# Patient Record
Sex: FEMALE | Race: BLACK OR AFRICAN AMERICAN | NOT HISPANIC OR LATINO | URBAN - METROPOLITAN AREA
[De-identification: names, ages, dates, MRNs, and addresses within clinical notes are randomized per-mention and may not be internally consistent; named-entity substitution may affect disease eponyms.]

---

## 2018-03-05 ENCOUNTER — TRANSCRIBE ORDERS (OUTPATIENT)
Dept: CARDIOLOGY | Facility: CLINIC | Age: 78
End: 2018-03-05

## 2018-03-05 DIAGNOSIS — R06.02 SOB (SHORTNESS OF BREATH): Primary | ICD-10-CM

## 2018-05-01 ENCOUNTER — TELEPHONE (OUTPATIENT)
Dept: SCHEDULING | Facility: CLINIC | Age: 78
End: 2018-05-01

## 2018-05-01 NOTE — TELEPHONE ENCOUNTER
Pt calling to state that she will be having a procedure on 5/9/18, they will be lifting the vein in  Her left wrist, she stated she has a fistula there, and the pt needs to be cleared. Dr. Ellis [phone], F# [phone] last seen on 2/28/18 can an addendum be written?

## 2018-05-01 NOTE — TELEPHONE ENCOUNTER
Dr Pittman pt calling re: Clearance Letter for Procedure on her port at [facility] on 5/9/18 w/ Dr Matos cell [phone]  [phone] ... Please call pt to advise

## 2018-05-03 ENCOUNTER — DOCUMENTATION (OUTPATIENT)
Dept: CARDIOLOGY | Facility: CLINIC | Age: 78
End: 2018-05-03

## 2018-05-03 NOTE — TELEPHONE ENCOUNTER
Spoke to Chica, told her you cleared her and to definitely schedule NST and follow up when she feels better after her surgery. I also left a message for Shaq in pre admin that the clearance will be faxed as soon as our phone lines are back up.   thx

## 2018-05-03 NOTE — TELEPHONE ENCOUNTER
Dr Hughes, Chica stopped in the office, She is wanting clearance for her Fistula adjustment scheduled for Wednesday 5-9-2018 at [facility]. I told her you wanted her to have a NST (which I had already tried to schedule several times but Chica did not call me back) When I told her that she said she she has dialysis M-W-F and that nothing is wrong with her heart and she just needs to get this surgery so she can get her life back. Please advise and also Chica has an appt with you on May 8th and she wants to know if she needs to keep that??  Thx

## 2018-05-03 NOTE — PROGRESS NOTES
The patient states that she is scheduled for dialysis access revision approximately 1 week from now.  She was recommended stress testing given her risk factors, but she has not undergone this study and states that she has very difficult IV access.  Her dialysis access is the priority at the present time.  The patient had an echocardiogram with normal left ventricular function in November 2017.  The patient presents acceptable risk to undergo dialysis access/revision.  Please allow her to take her carvedilol the morning of the procedure with a small sip of water.  If you have any further questions, please do not hesitate to contact me.

## 2018-05-03 NOTE — TELEPHONE ENCOUNTER
I put a note in her chart for preoperative clearance.  If she is having the procedure on the ninth, I do not need to see her on the eighth.  She should schedule a follow-up appointment following her dialysis access and try to get her stress test scheduled.  Thank you

## 2018-05-04 PROBLEM — Z82.49 FAMILY HISTORY OF EARLY CAD: Status: ACTIVE | Noted: 2018-05-04

## 2018-05-04 PROBLEM — I10 HYPERTENSION: Status: ACTIVE | Noted: 2018-05-04

## 2018-12-11 ENCOUNTER — TELEPHONE (OUTPATIENT)
Dept: CARDIOLOGY | Facility: CLINIC | Age: 78
End: 2018-12-11

## 2019-01-15 ENCOUNTER — OFFICE VISIT (OUTPATIENT)
Dept: CARDIOLOGY | Facility: CLINIC | Age: 79
End: 2019-01-15
Payer: COMMERCIAL

## 2019-01-15 VITALS
DIASTOLIC BLOOD PRESSURE: 66 MMHG | SYSTOLIC BLOOD PRESSURE: 130 MMHG | WEIGHT: 194.8 LBS | RESPIRATION RATE: 18 BRPM | BODY MASS INDEX: 35.85 KG/M2 | HEART RATE: 60 BPM | HEIGHT: 62 IN

## 2019-01-15 DIAGNOSIS — Z99.2 ESRD ON HEMODIALYSIS (CMS/HCC): ICD-10-CM

## 2019-01-15 DIAGNOSIS — I10 ESSENTIAL HYPERTENSION: Primary | ICD-10-CM

## 2019-01-15 DIAGNOSIS — R94.31 ABNORMAL EKG: ICD-10-CM

## 2019-01-15 DIAGNOSIS — I49.8 BRADYARRHYTHMIA: ICD-10-CM

## 2019-01-15 DIAGNOSIS — N18.6 ESRD ON HEMODIALYSIS (CMS/HCC): ICD-10-CM

## 2019-01-15 DIAGNOSIS — M06.9 RHEUMATOID ARTHRITIS INVOLVING BOTH HANDS, UNSPECIFIED RHEUMATOID FACTOR PRESENCE: ICD-10-CM

## 2019-01-15 DIAGNOSIS — I49.1 ECTOPIC ATRIAL RHYTHM: ICD-10-CM

## 2019-01-15 DIAGNOSIS — I49.1 PAC (PREMATURE ATRIAL CONTRACTION): ICD-10-CM

## 2019-01-15 PROBLEM — F32.9 MDD (MAJOR DEPRESSIVE DISORDER): Status: ACTIVE | Noted: 2018-07-30

## 2019-01-15 PROBLEM — R42 DISEQUILIBRIUM: Status: ACTIVE | Noted: 2018-08-12

## 2019-01-15 PROBLEM — E87.70 VOLUME OVERLOAD: Status: ACTIVE | Noted: 2018-07-29

## 2019-01-15 PROBLEM — R42 VERTIGO: Status: ACTIVE | Noted: 2018-05-31

## 2019-01-15 PROCEDURE — 99214 OFFICE O/P EST MOD 30 MIN: CPT | Performed by: INTERNAL MEDICINE

## 2019-01-15 PROCEDURE — 93000 ELECTROCARDIOGRAM COMPLETE: CPT | Mod: XU | Performed by: INTERNAL MEDICINE

## 2019-01-15 RX ORDER — FUROSEMIDE 80 MG/1
80 TABLET ORAL DAILY
COMMUNITY
End: 2021-10-07

## 2019-01-15 RX ORDER — SEVELAMER CARBONATE 800 MG/1
TABLET, FILM COATED ORAL
Refills: 3 | COMMUNITY
Start: 2019-01-11

## 2019-01-15 RX ORDER — ESCITALOPRAM OXALATE 10 MG/1
5 TABLET ORAL DAILY
COMMUNITY

## 2019-01-15 RX ORDER — IRBESARTAN 75 MG/1
37.5 TABLET ORAL DAILY
COMMUNITY
End: 2021-10-07 | Stop reason: ALTCHOICE

## 2019-01-15 RX ORDER — DOCUSATE SODIUM 100 MG/1
100 CAPSULE, LIQUID FILLED ORAL NIGHTLY
COMMUNITY
End: 2022-10-11 | Stop reason: ALTCHOICE

## 2019-01-15 RX ORDER — MECLIZINE HCL 12.5 MG 12.5 MG/1
TABLET ORAL
COMMUNITY
End: 2022-10-11 | Stop reason: ALTCHOICE

## 2019-01-15 NOTE — LETTER
January 16, 2019     Alex Monique MD  [address] [facility] [address]    Patient: Chica Hurd   YOB: 1940   Date of Visit: 1/15/2019       Dear Dr. Monique:    Thank you for referring Chica Hurd to me for evaluation. Below are my notes for this consultation.    If you have questions, please do not hesitate to call me. I look forward to following your patient along with you.         Sincerely,        Christal Olvera MD        CC: Helen Mcknight, Christal Sarmiento MD  1/16/2019  9:58 AM  Signed  Patient ID: Chica Hurd is a 78 y.o. female. 1940  HPI   Chica comes to the office today for the first time in almost a year to follow-up regarding her hypertension, now end-stage renal disease on dialysis, rheumatoid arthritis and an abnormal heart rhythm at dialysis.  I had spoken with her nephrologist and she was having some ectopic atrial rhythm but also complaining of palpitations, the so there was concern about atrial fibrillation.  There has not been any documented atrial fibrillation to this point.  As you recall, we had attempted pharmacologic nuclear stress testing prior to her undergoing fistula surgery, but she has such poor access that we are unable to place an IV successfully.  Her LV function was normal on echocardiogram and she move forward with surgery.  She did not have any cardiac complications and her fistula is functioning well.  We reviewed her medications in detail.    In review of systems, she has had intermittent issues with vertigo and is about to start therapy for this.  She was in the emergency room at [facility] with hypertension of volume overload several months ago, but seems to be doing better.  She was also seen there with respect to the vertigo.  Her palpitations are not related to her feelings of vertigo or dizziness.  She denies any chest pain or shortness of breath at dialysis.       Past Medical History:   Diagnosis Date   • Anemia     • Asthma    • Cataracts, bilateral    • COPD (chronic obstructive pulmonary disease) (CMS/HCC) ([facility])    • Hypertension    • Lipid disorder    • Pre-diabetes    • Renal disease     ESRD on HD   • Rheumatoid arthritis (CMS/[facility]) ([facility])      Past Surgical History:   Procedure Laterality Date   • AV FISTULA PLACEMENT Left 2018    LUE fistula   • CATARACT EXTRACTION W/  INTRAOCULAR LENS IMPLANT  2013   • HEMORRHOID SURGERY     • HYSTERECTOMY     • TOTAL HIP ARTHROPLASTY Bilateral    • TYMPANOPLASTY       Social History   Substance Use Topics   • Smoking status: Former Smoker     Packs/day: 0.25     Quit date: 1981   • Smokeless tobacco: Never Used   • Alcohol use No     Family History   Problem Relation Age of Onset   • Heart failure Father    • Heart attack Father    • Hypertension Father    • Asthma Mother         Allergies:Penicillins    Current Outpatient Prescriptions:   •  acetaminophen (TYLENOL EXTRA STRENGTH) 500 mg tablet, Take 500 mg by mouth every 12 (twelve) hours., Disp: , Rfl:   •  budesonide-formoterol (SYMBICORT) 160-4.5 mcg/actuation inhaler, Inhale. INHALE 2 PUFFS BY INHALATION ROUTE 2 TIMES EVERYDAY IN THE MORNING AND THE EVENING, Disp: , Rfl:   •  carvedilol (COREG) 12.5 mg tablet, Take 6.25 mg by mouth 2 (two) times a day with meals.  , Disp: , Rfl:   •  cetirizine (ZyrTEC) 10 mg tablet, Take 10 mg by mouth daily., Disp: , Rfl:   •  docusate sodium (COLACE) 100 mg capsule, Take 100 mg by mouth nightly., Disp: , Rfl:   •  escitalopram (LEXAPRO) 10 mg tablet, Take 10 mg by mouth daily., Disp: , Rfl:   •  fluticasone (FLONASE) 50 mcg/actuation nasal spray, Administer into affected nostril(s) daily. INHALE 1 SPRAY BY INTRANASAL ROUTE EVERYDAY IN EACH NOSTRIL, Disp: , Rfl:   •  folic acid (FOLVITE) 1 mg tablet, Take 1 mg by mouth daily., Disp: , Rfl:   •  furosemide (LASIX) 80 mg tablet, Take 80 mg by mouth daily., Disp: , Rfl:   •  irbesartan (AVAPRO) 75 mg tablet, Take 75 mg by mouth daily., Disp: ,  "Rfl:   •  LORazepam (ATIVAN) 0.5 mg tablet, Take 0.5 mg by mouth 2 (two) times a day as needed.  , Disp: , Rfl:   •  meclizine (ANTIVERT) 12.5 mg tablet, Take 1-2 tablets by mouth every 6 hours as needed, Disp: , Rfl:   •  predniSONE (DELTASONE) 5 mg tablet, Take 5 mg by mouth daily.  , Disp: , Rfl:   •  sevelamer (RENVELA) 800 mg tablet, TAKE 1 TABLET BY ORAL ROUTE 3 TIMES EVERY DAY WITH FOOD, Disp: , Rfl: 3     Review of Systems   Constitution: Positive for weight loss. Negative for chills and fever.        Patient has lost approximately 20 pounds since initiating dialysis.   HENT: Negative for nosebleeds.    Eyes: Negative for visual disturbance.   Cardiovascular: Positive for dyspnea on exertion and palpitations. Negative for chest pain, claudication, irregular heartbeat, leg swelling, near-syncope, orthopnea, paroxysmal nocturnal dyspnea and syncope.   Respiratory: Negative for hemoptysis, shortness of breath, snoring, sputum production and wheezing.    Endocrine: Negative for heat intolerance.   Hematologic/Lymphatic: Negative for bleeding problem. Does not bruise/bleed easily.   Skin: Negative for color change and unusual hair distribution.   Musculoskeletal: Positive for arthritis. Negative for back pain, falls and myalgias.   Gastrointestinal: Negative for hematemesis, jaundice and melena.   Genitourinary: Negative for flank pain and hematuria.   Neurological: Positive for vertigo. Negative for excessive daytime sleepiness, dizziness, focal weakness, headaches and light-headedness.   Psychiatric/Behavioral: Negative for memory loss. The patient is nervous/anxious.    Allergic/Immunologic: Negative for environmental allergies.     Vitals:    01/15/19 1118   BP: 130/66   BP Location: Right upper arm   Patient Position: Sitting   Pulse: 60   Resp: 18   Weight: 88.4 kg (194 lb 12.8 oz)   Height: 1.562 m (5' 1.5\")     BP Readings from Last 3 Encounters:   01/15/19 130/66     Wt Readings from Last 3 Encounters: "   01/15/19 88.4 kg (194 lb 12.8 oz)     Physical Exam   Constitutional: She is oriented to person, place, and time. She appears well-developed and well-nourished. She is cooperative.   Obese -American female in no apparent distress   HENT:   Head: Normocephalic and atraumatic.   Mouth/Throat: Oropharynx is clear and moist and mucous membranes are normal.   Eyes: Conjunctivae and EOM are normal. No scleral icterus.   Neck: Trachea normal and normal range of motion. Neck supple. No hepatojugular reflux and no JVD present. Carotid bruit is not present. No thyromegaly present.   Cardiovascular: Normal rate, regular rhythm, S1 normal, S2 normal and intact distal pulses.   Occasional extrasystoles are present. Exam reveals gallop and S4. Exam reveals no S3 and no friction rub.    Murmur heard.  Pulses:       Carotid pulses are 2+ on the right side, and 2+ on the left side.  There is a soft systolic murmur at the right and left sternal borders.   Pulmonary/Chest: Effort normal and breath sounds normal. She has no wheezes. She has no rhonchi. She has no rales.   Abdominal: Soft. Bowel sounds are normal. She exhibits no abdominal bruit. There is no tenderness.   Musculoskeletal: Normal range of motion. She exhibits no tenderness or deformity.   Chronic arthritic changes multiple joints  Left upper extremity AV fistula with thrill and bruit   Neurological: She is alert and oriented to person, place, and time. No cranial nerve deficit.   Skin: Skin is warm, dry and intact. No rash noted. No cyanosis. Nails show no clubbing.   Psychiatric: Her speech is normal and behavior is normal. Her mood appears anxious.          Imaging:  Echo 7/30/18: Normal LV size and function.  Impaired relaxation.  Normal RV size and function.  Normal left and right atria.  Sclerotic trileaflet aortic valve.  Mean gradient across aortic valve 11 mmHg.  ANGELA 1.6 cm² of mild calcification.  Mild mitral annular calcification with trace to at most  mild mitral regurgitation.  Mild TR with PASP 41 mmHg.  No pericardial effusion.    Echo 2017: LVEF 60%.  Mild LVH.  Impaired relaxation.  Grossly normal wall motion.  Normal RV size and function.  Mildly dilated left atrium.  Mild MR.  Mild TR.  No pericardial effusion.    EK. Essential hypertension  Chica should remain on her current medical regimen including Avapro 75 mg daily and furosemide 80 mg daily.  I reviewed the fact that she is decreasing her carvedilol to 1/2 tablet daily for a total of 6.25 mg twice a day.    2. ESRD on hemodialysis (CMS/[facility]) ([facility])  Continue recommendations as per nephrology.  Left upper extremity fistula is functioning well.    3. Rheumatoid arthritis involving both hands, unspecified rheumatoid factor presence (CMS/[facility])  On chronic prednisone 5 mg daily.    4. PAC (premature atrial contraction)  Has had documented occasional PACs, but no sustained arrhythmia.  Continue carvedilol 6.25 mg twice daily and antihypertensive regimen.  Her left atrium is at most mildly dilated and she has preserved LV function.    5. Bradyarrhythmia  She has had some heart rates in the 50s, but no evidence of high degree AV block.  She is at risk for conduction disease with her chronic renal failure and age.  We placed a 72-hour monitor today in the office to assess her rhythm.    6. Abnormal EKG  EKG is abnormal but unchanged.  No recent stress test because patient cannot exercise on a treadmill and has very poor IV access aside from her AV fistula.    7. Ectopic atrial rhythm  Ectopic atrial rhythm on EKG today.  Patient is asymptomatic.  72-hour outpatient monitor placed.  Continue current therapy.    Return in about 6 months (around 7/15/2019) for next scheduled follow-up, earlier with any change in symptoms. The following records were reviewed as part of this visit: office notes, ER records, lab reports, historical medical records.  We reviewed the results of her echocardiogram performed  at [facility].  I will contact her regarding the results of her Holter monitor.  She understands that if she has any chest pain, increasing shortness of breath, syncope or presyncope that she should seek immediate medical attention.  Thank you for allowing me to participate in the care of this patient.  I hope this information is helpful.    Christal Olvera MD   1/16/2019  9:46 AM

## 2019-01-16 PROBLEM — R94.31 ABNORMAL EKG: Status: ACTIVE | Noted: 2019-01-16

## 2019-01-16 PROBLEM — I49.1 ECTOPIC ATRIAL RHYTHM: Status: ACTIVE | Noted: 2019-01-16

## 2019-01-16 ASSESSMENT — ENCOUNTER SYMPTOMS
FALLS: 0
PALPITATIONS: 1
DYSPNEA ON EXERTION: 1
CHILLS: 0
MYALGIAS: 0
WHEEZING: 0
EXCESSIVE DAYTIME SLEEPINESS: 0
FEVER: 0
HEMATEMESIS: 0
HEMATURIA: 0
FLANK PAIN: 0
NEAR-SYNCOPE: 0
JAUNDICE: 0
SPUTUM PRODUCTION: 0
VERTIGO: 1
LIGHT-HEADEDNESS: 0
HEMOPTYSIS: 0
IRREGULAR HEARTBEAT: 0
NERVOUS/ANXIOUS: 1
SHORTNESS OF BREATH: 0
BACK PAIN: 0
UNUSUAL HAIR DISTRIBUTION: 0
SYNCOPE: 0
CLAUDICATION: 0
WEIGHT LOSS: 1
MEMORY LOSS: 0
ORTHOPNEA: 0
HEADACHES: 0
DIZZINESS: 0
BRUISES/BLEEDS EASILY: 0
FOCAL WEAKNESS: 0
PND: 0
SNORING: 0
COLOR CHANGE: 0

## 2019-01-16 NOTE — PROGRESS NOTES
Patient ID: Chica Hurd is a 78 y.o. female. 1940  TASHI Coto comes to the office today for the first time in almost a year to follow-up regarding her hypertension, now end-stage renal disease on dialysis, rheumatoid arthritis and an abnormal heart rhythm at dialysis.  I had spoken with her nephrologist and she was having some ectopic atrial rhythm but also complaining of palpitations, the so there was concern about atrial fibrillation.  There has not been any documented atrial fibrillation to this point.  As you recall, we had attempted pharmacologic nuclear stress testing prior to her undergoing fistula surgery, but she has such poor access that we are unable to place an IV successfully.  Her LV function was normal on echocardiogram and she move forward with surgery.  She did not have any cardiac complications and her fistula is functioning well.  We reviewed her medications in detail.    In review of systems, she has had intermittent issues with vertigo and is about to start therapy for this.  She was in the emergency room at [facility] with hypertension of volume overload several months ago, but seems to be doing better.  She was also seen there with respect to the vertigo.  Her palpitations are not related to her feelings of vertigo or dizziness.  She denies any chest pain or shortness of breath at dialysis.       Past Medical History:   Diagnosis Date   • Anemia    • Asthma    • Cataracts, bilateral    • COPD (chronic obstructive pulmonary disease) (CMS/[facility]) ([facility])    • Hypertension    • Lipid disorder    • Pre-diabetes    • Renal disease     ESRD on HD   • Rheumatoid arthritis (CMS/[facility]) ([facility])      Past Surgical History:   Procedure Laterality Date   • AV FISTULA PLACEMENT Left 2018    LUE fistula   • CATARACT EXTRACTION W/  INTRAOCULAR LENS IMPLANT  2013   • HEMORRHOID SURGERY     • HYSTERECTOMY     • TOTAL HIP ARTHROPLASTY Bilateral    • TYMPANOPLASTY       Social History   Substance Use Topics    • Smoking status: Former Smoker     Packs/day: 0.25     Quit date: 1981   • Smokeless tobacco: Never Used   • Alcohol use No     Family History   Problem Relation Age of Onset   • Heart failure Father    • Heart attack Father    • Hypertension Father    • Asthma Mother         Allergies:Penicillins    Current Outpatient Prescriptions:   •  acetaminophen (TYLENOL EXTRA STRENGTH) 500 mg tablet, Take 500 mg by mouth every 12 (twelve) hours., Disp: , Rfl:   •  budesonide-formoterol (SYMBICORT) 160-4.5 mcg/actuation inhaler, Inhale. INHALE 2 PUFFS BY INHALATION ROUTE 2 TIMES EVERYDAY IN THE MORNING AND THE EVENING, Disp: , Rfl:   •  carvedilol (COREG) 12.5 mg tablet, Take 6.25 mg by mouth 2 (two) times a day with meals.  , Disp: , Rfl:   •  cetirizine (ZyrTEC) 10 mg tablet, Take 10 mg by mouth daily., Disp: , Rfl:   •  docusate sodium (COLACE) 100 mg capsule, Take 100 mg by mouth nightly., Disp: , Rfl:   •  escitalopram (LEXAPRO) 10 mg tablet, Take 10 mg by mouth daily., Disp: , Rfl:   •  fluticasone (FLONASE) 50 mcg/actuation nasal spray, Administer into affected nostril(s) daily. INHALE 1 SPRAY BY INTRANASAL ROUTE EVERYDAY IN EACH NOSTRIL, Disp: , Rfl:   •  folic acid (FOLVITE) 1 mg tablet, Take 1 mg by mouth daily., Disp: , Rfl:   •  furosemide (LASIX) 80 mg tablet, Take 80 mg by mouth daily., Disp: , Rfl:   •  irbesartan (AVAPRO) 75 mg tablet, Take 75 mg by mouth daily., Disp: , Rfl:   •  LORazepam (ATIVAN) 0.5 mg tablet, Take 0.5 mg by mouth 2 (two) times a day as needed.  , Disp: , Rfl:   •  meclizine (ANTIVERT) 12.5 mg tablet, Take 1-2 tablets by mouth every 6 hours as needed, Disp: , Rfl:   •  predniSONE (DELTASONE) 5 mg tablet, Take 5 mg by mouth daily.  , Disp: , Rfl:   •  sevelamer (RENVELA) 800 mg tablet, TAKE 1 TABLET BY ORAL ROUTE 3 TIMES EVERY DAY WITH FOOD, Disp: , Rfl: 3     Review of Systems   Constitution: Positive for weight loss. Negative for chills and fever.        Patient has lost approximately  "20 pounds since initiating dialysis.   HENT: Negative for nosebleeds.    Eyes: Negative for visual disturbance.   Cardiovascular: Positive for dyspnea on exertion and palpitations. Negative for chest pain, claudication, irregular heartbeat, leg swelling, near-syncope, orthopnea, paroxysmal nocturnal dyspnea and syncope.   Respiratory: Negative for hemoptysis, shortness of breath, snoring, sputum production and wheezing.    Endocrine: Negative for heat intolerance.   Hematologic/Lymphatic: Negative for bleeding problem. Does not bruise/bleed easily.   Skin: Negative for color change and unusual hair distribution.   Musculoskeletal: Positive for arthritis. Negative for back pain, falls and myalgias.   Gastrointestinal: Negative for hematemesis, jaundice and melena.   Genitourinary: Negative for flank pain and hematuria.   Neurological: Positive for vertigo. Negative for excessive daytime sleepiness, dizziness, focal weakness, headaches and light-headedness.   Psychiatric/Behavioral: Negative for memory loss. The patient is nervous/anxious.    Allergic/Immunologic: Negative for environmental allergies.     Vitals:    01/15/19 1118   BP: 130/66   BP Location: Right upper arm   Patient Position: Sitting   Pulse: 60   Resp: 18   Weight: 88.4 kg (194 lb 12.8 oz)   Height: 1.562 m (5' 1.5\")     BP Readings from Last 3 Encounters:   01/15/19 130/66     Wt Readings from Last 3 Encounters:   01/15/19 88.4 kg (194 lb 12.8 oz)     Physical Exam   Constitutional: She is oriented to person, place, and time. She appears well-developed and well-nourished. She is cooperative.   Obese -American female in no apparent distress   HENT:   Head: Normocephalic and atraumatic.   Mouth/Throat: Oropharynx is clear and moist and mucous membranes are normal.   Eyes: Conjunctivae and EOM are normal. No scleral icterus.   Neck: Trachea normal and normal range of motion. Neck supple. No hepatojugular reflux and no JVD present. Carotid bruit " is not present. No thyromegaly present.   Cardiovascular: Normal rate, regular rhythm, S1 normal, S2 normal and intact distal pulses.   Occasional extrasystoles are present. Exam reveals gallop and S4. Exam reveals no S3 and no friction rub.    Murmur heard.  Pulses:       Carotid pulses are 2+ on the right side, and 2+ on the left side.  There is a soft systolic murmur at the right and left sternal borders.   Pulmonary/Chest: Effort normal and breath sounds normal. She has no wheezes. She has no rhonchi. She has no rales.   Abdominal: Soft. Bowel sounds are normal. She exhibits no abdominal bruit. There is no tenderness.   Musculoskeletal: Normal range of motion. She exhibits no tenderness or deformity.   Chronic arthritic changes multiple joints  Left upper extremity AV fistula with thrill and bruit   Neurological: She is alert and oriented to person, place, and time. No cranial nerve deficit.   Skin: Skin is warm, dry and intact. No rash noted. No cyanosis. Nails show no clubbing.   Psychiatric: Her speech is normal and behavior is normal. Her mood appears anxious.          Imaging:  Echo 18: Normal LV size and function.  Impaired relaxation.  Normal RV size and function.  Normal left and right atria.  Sclerotic trileaflet aortic valve.  Mean gradient across aortic valve 11 mmHg.  ANGELA 1.6 cm² of mild calcification.  Mild mitral annular calcification with trace to at most mild mitral regurgitation.  Mild TR with PASP 41 mmHg.  No pericardial effusion.    Echo 2017: LVEF 60%.  Mild LVH.  Impaired relaxation.  Grossly normal wall motion.  Normal RV size and function.  Mildly dilated left atrium.  Mild MR.  Mild TR.  No pericardial effusion.    EK. Essential hypertension  Chica should remain on her current medical regimen including Avapro 75 mg daily and furosemide 80 mg daily.  I reviewed the fact that she is decreasing her carvedilol to 1/2 tablet daily for a total of 6.25 mg twice a day.    2.  ESRD on hemodialysis (CMS/[facility]) ([facility])  Continue recommendations as per nephrology.  Left upper extremity fistula is functioning well.    3. Rheumatoid arthritis involving both hands, unspecified rheumatoid factor presence (CMS/[facility])  On chronic prednisone 5 mg daily.    4. PAC (premature atrial contraction)  Has had documented occasional PACs, but no sustained arrhythmia.  Continue carvedilol 6.25 mg twice daily and antihypertensive regimen.  Her left atrium is at most mildly dilated and she has preserved LV function.    5. Bradyarrhythmia  She has had some heart rates in the 50s, but no evidence of high degree AV block.  She is at risk for conduction disease with her chronic renal failure and age.  We placed a 72-hour monitor today in the office to assess her rhythm.    6. Abnormal EKG  EKG is abnormal but unchanged.  No recent stress test because patient cannot exercise on a treadmill and has very poor IV access aside from her AV fistula.    7. Ectopic atrial rhythm  Ectopic atrial rhythm on EKG today.  Patient is asymptomatic.  72-hour outpatient monitor placed.  Continue current therapy.    Return in about 6 months (around 7/15/2019) for next scheduled follow-up, earlier with any change in symptoms. The following records were reviewed as part of this visit: office notes, ER records, lab reports, historical medical records.  We reviewed the results of her echocardiogram performed at [facility].  I will contact her regarding the results of her Holter monitor.  She understands that if she has any chest pain, increasing shortness of breath, syncope or presyncope that she should seek immediate medical attention.  Thank you for allowing me to participate in the care of this patient.  I hope this information is helpful.    Christal Olvera MD   1/16/2019  9:46 AM

## 2019-01-29 ENCOUNTER — TELEPHONE (OUTPATIENT)
Dept: SCHEDULING | Facility: CLINIC | Age: 79
End: 2019-01-29

## 2019-01-29 NOTE — TELEPHONE ENCOUNTER
Pt states she received call from [place] but does not know what call was in regards to. Pt can be reached at [phone].

## 2019-01-29 NOTE — PROGRESS NOTES
Called and spoke with patient, message and results given patient verbalized full understanding. Patient scheduled to see Dr. Boogie 2/19/2019. Thank you.

## 2019-02-19 ENCOUNTER — OFFICE VISIT (OUTPATIENT)
Dept: CARDIOLOGY | Facility: CLINIC | Age: 79
End: 2019-02-19
Payer: COMMERCIAL

## 2019-02-19 VITALS
DIASTOLIC BLOOD PRESSURE: 70 MMHG | BODY MASS INDEX: 36.33 KG/M2 | WEIGHT: 197.4 LBS | SYSTOLIC BLOOD PRESSURE: 120 MMHG | HEIGHT: 62 IN | HEART RATE: 70 BPM | RESPIRATION RATE: 16 BRPM

## 2019-02-19 DIAGNOSIS — R00.2 PALPITATION: ICD-10-CM

## 2019-02-19 DIAGNOSIS — I49.1 ECTOPIC ATRIAL RHYTHM: ICD-10-CM

## 2019-02-19 DIAGNOSIS — I10 ESSENTIAL HYPERTENSION: ICD-10-CM

## 2019-02-19 DIAGNOSIS — R94.31 ABNORMAL EKG: Primary | ICD-10-CM

## 2019-02-19 PROCEDURE — 99204 OFFICE O/P NEW MOD 45 MIN: CPT | Performed by: INTERNAL MEDICINE

## 2019-02-19 PROCEDURE — 93000 ELECTROCARDIOGRAM COMPLETE: CPT | Performed by: INTERNAL MEDICINE

## 2019-02-19 RX ORDER — ALBUTEROL SULFATE 0.83 MG/ML
SOLUTION RESPIRATORY (INHALATION)
COMMUNITY
Start: 2019-01-31

## 2019-02-19 ASSESSMENT — ENCOUNTER SYMPTOMS
PALPITATIONS: 1
FOCAL WEAKNESS: 0
WHEEZING: 0
DOUBLE VISION: 0
HEARTBURN: 1
SYNCOPE: 0
NERVOUS/ANXIOUS: 1
VOMITING: 0
COUGH: 0
ABDOMINAL PAIN: 0
BACK PAIN: 1
WEIGHT LOSS: 0
TREMORS: 0
DYSURIA: 0
SHORTNESS OF BREATH: 1
ADENOPATHY: 0
DECREASED APPETITE: 0
CHILLS: 0
NEAR-SYNCOPE: 0
ORTHOPNEA: 0
INSOMNIA: 1
NUMBNESS: 0
FEVER: 0
LOSS OF BALANCE: 0
DIARRHEA: 0
ANOREXIA: 0
HEADACHES: 0
NAUSEA: 0
ALTERED MENTAL STATUS: 0
SEIZURES: 0
DYSPNEA ON EXERTION: 1

## 2019-02-19 ASSESSMENT — LIFESTYLE VARIABLES: SUBSTANCE_ABUSE: 0

## 2019-02-19 NOTE — PROGRESS NOTES
Electrophysiology Office Note  Eliazar Boogie MD [facility]  [phone] (P)  [phone] (F)   Chica Hurd  YOB: 1940  Date of visit: 2/19/2019        Chica Hurd is a 78 y.o. female who is referred to see me today for evaluation and management of frequent atrial ectopies.    Patient carries history of hypertension, COPD, and end-stage renal disease.  She has been on dialysis for 1 year.  While on dialysis, patient was noted to have frequent atrial ectopies. Patient complained of palpitation only when she is anxious especially when staff at dialysis center checks on her dialysis status.  She denies syncope or presyncope.  She has been complaining of vertigo symptom for which she has been placed on meclizine.  At baseline, she lives by herself.  She complains of fatigue after dialysis.  She managed to cook and clean.  Her cousins live close by is her support.  She was referred to see Dr. Hughes for evaluation.  4 days monitor was ordered.  Patient was noted to have frequent PAC with up to 4 beats of atrial tachycardia.  She was asymptomatic during the monitor.      Patient Active Problem List   Diagnosis   • Family history of early CAD   • Hypertension   • Disequilibrium   • ESRD on hemodialysis (CMS/[facility]) ([facility])   • RA (rheumatoid arthritis) (CMS/[facility]) ([facility])   • Vertigo   • MDD (major depressive disorder)   • Abnormal EKG   • Ectopic atrial rhythm   • Palpitation     Past Medical History:   Diagnosis Date   • Anemia    • Asthma    • Cataracts, bilateral    • COPD (chronic obstructive pulmonary disease) (CMS/[facility]) ([facility])    • Hypertension    • Lipid disorder    • Pre-diabetes    • Renal disease     ESRD on HD   • Rheumatoid arthritis (CMS/[facility]) ([facility])      Past Surgical History:   Procedure Laterality Date   • AV FISTULA PLACEMENT Left 2018    LUE fistula   • CATARACT EXTRACTION W/  INTRAOCULAR LENS IMPLANT  2013   • HEMORRHOID SURGERY     • HYSTERECTOMY     • TOTAL HIP ARTHROPLASTY Bilateral    •  TYMPANOPLASTY       Social History   Substance Use Topics   • Smoking status: Former Smoker     Packs/day: 0.25     Quit date: 1981   • Smokeless tobacco: Never Used   • Alcohol use No     Family History   Problem Relation Age of Onset   • Heart failure Father    • Heart attack Father 57   • Hypertension Father    • Asthma Mother    • Heart attack Mother 47   • Diabetes Sister    • Heart attack Brother      Allergies:  Penicillins      Current Outpatient Prescriptions:   •  acetaminophen (TYLENOL EXTRA STRENGTH) 500 mg tablet, Take 500 mg by mouth every 12 (twelve) hours., Disp: , Rfl:   •  albuterol 2.5 mg /3 mL (0.083 %) nebulizer solution, Us as directed, Disp: , Rfl:   •  budesonide-formoterol (SYMBICORT) 160-4.5 mcg/actuation inhaler, Inhale 1 puff 2 (two) times a day.  , Disp: , Rfl:   •  carvedilol (COREG) 12.5 mg tablet, Take 6.25 mg by mouth 2 (two) times a day with meals.  , Disp: , Rfl:   •  cetirizine (ZyrTEC) 10 mg tablet, Take 10 mg by mouth daily., Disp: , Rfl:   •  docusate sodium (COLACE) 100 mg capsule, Take 100 mg by mouth nightly., Disp: , Rfl:   •  escitalopram (LEXAPRO) 10 mg tablet, Take 5 mg by mouth daily.  , Disp: , Rfl:   •  fluticasone (FLONASE) 50 mcg/actuation nasal spray, Administer 1 spray into each nostril daily.  , Disp: , Rfl:   •  folic acid (FOLVITE) 1 mg tablet, Take 1 mg by mouth daily., Disp: , Rfl:   •  furosemide (LASIX) 80 mg tablet, Take 40 mg by mouth daily.  , Disp: , Rfl:   •  irbesartan (AVAPRO) 75 mg tablet, Take 37.5 mg by mouth daily.  , Disp: , Rfl:   •  LORazepam (ATIVAN) 0.5 mg tablet, Take 0.5 mg by mouth 2 (two) times a day as needed.  , Disp: , Rfl:   •  meclizine (ANTIVERT) 12.5 mg tablet, Take 1-2 tablets by mouth every 6 hours as needed, Disp: , Rfl:   •  predniSONE (DELTASONE) 5 mg tablet, Take 5 mg by mouth daily.  , Disp: , Rfl:   •  sevelamer (RENVELA) 800 mg tablet, TAKE 1 TABLET BY ORAL ROUTE 3 TIMES EVERY DAY WITH FOOD, Disp: , Rfl: 3    Review of  "Systems   Constitution: Negative for chills, decreased appetite, fever and weight loss.   HENT: Negative for congestion and ear pain.    Eyes: Negative for double vision and visual disturbance.   Cardiovascular: Positive for dyspnea on exertion and palpitations. Negative for chest pain, leg swelling, near-syncope, orthopnea and syncope.   Respiratory: Positive for shortness of breath. Negative for cough and wheezing.    Endocrine: Negative for cold intolerance and heat intolerance.   Hematologic/Lymphatic: Negative for adenopathy and bleeding problem.   Skin: Negative for rash.   Musculoskeletal: Positive for arthritis, back pain and joint pain. Negative for muscle weakness.   Gastrointestinal: Positive for heartburn. Negative for abdominal pain, anorexia, diarrhea, melena, nausea and vomiting.   Genitourinary: Negative for dysuria and nocturia.   Neurological: Negative for focal weakness, headaches, loss of balance, numbness, seizures and tremors.   Psychiatric/Behavioral: Negative for altered mental status, substance abuse and suicidal ideas. The patient has insomnia and is nervous/anxious.      Objective   Vitals:    02/19/19 0848   BP: 120/70   BP Location: Right upper arm   Patient Position: Sitting   Pulse: 70   Resp: 16   Weight: 89.5 kg (197 lb 6.4 oz)   Height: 1.562 m (5' 1.5\")     Physical Exam   Constitutional: She is oriented to person, place, and time. She appears well-developed and well-nourished.   HENT:   Head: Normocephalic and atraumatic.   Eyes: EOM are normal. Pupils are equal, round, and reactive to light.   Neck: Normal range of motion. Neck supple.   Cardiovascular: Normal rate and regular rhythm.   Occasional extrasystoles are present. Exam reveals gallop and S4.    2/6 ABLA at RUSB rad to LUSB   Pulmonary/Chest: Effort normal and breath sounds normal.   Abdominal: Soft. Bowel sounds are normal.   Musculoskeletal: Normal range of motion. She exhibits no edema.   AV fistula at left forearm; " Non pitting pedal edema   Neurological: She is alert and oriented to person, place, and time.   Skin: Skin is warm and dry.   Psychiatric: She has a normal mood and affect.      EKG:  Ectopic atrial rhythm at 70 bpm.  Otherwise normal EKG.    Echocardiogram:   7/30/2018  Normal LV size and function.  Impaired relaxation.  Normal RV size and function.  Normal left and right atria.  Sclerotic trileaflet aortic valve.  Mean gradient across aortic valve 11 mmHg.  ANGELA 1.6 cm² of mild calcification.  Mild mitral annular calcification with trace to at most mild mitral regurgitation.  Mild TR with PASP 41 mmHg.  No pericardial effusion.    Event monitor:  1/15/2019 (4 days 14 hour monitor)  Sinus rhythm with heart rate of 51 bpm to 110 bpm.  Frequent ectopies with total burden 3.6%.  Rare episodes of nonsustained atrial tachycardia longest 4 beats.  Occasional PVC with 4 beats of nonsustained VT.     Assessment   Hypertension  Patient has chronic hypertension.  Her blood pressure has been under good control on current regimen.  Blood pressure often will go lower after dialysis.  I am reluctant to give her more beta-blocker concerning that may affect her hemodialysis session.  We will continue current medication without changes.  Patient is diagnosed frequently with her living by herself.  We discussed low-sodium diet.    Palpitation  Patient is noted to have frequent atrial ectopy seen during dialysis.  She has long-standing history of palpitation correlated with anxiety.  4 days monitor showed frequent PACs with total burden of 3.5%.  There is no complex atrial tachyarrhythmia.  PAC's correlated poorly with her symptoms.  Echocardiogram showed only mildly dilated left atria.  We certainly are concern of possible atrial fibrillation but with frequent PAC, implantable loop monitor would likely generate frequent false positive finding for atrial fibrillation.  She is also noted to have ectopic atrial rhythm.  Although no  significant bradycardia noted on monitor, I am concerned of using ahigher dose of beta-blocker may result of bradyarrhythmia.  Given the overall burden of PAC is not that high, I would favor continue on current dose of carvedilol.  We will continue to closely monitor for ectopies.  Once patient is diagnosed with atrial fibrillation, we will need to consider use of warfarin as anticoagulation.  We also discussed that atrial tachyarrhythmia would likely worsened as she gets older.  We likely would revisit the management of her frequent atrial ectopies in the near future.  Patient is advised to call if she is suffering palpitation, dizziness, syncope, or presyncope.  All of the questions were answered to her satisfaction.            It is my pleasure to be involved in this patient's care.  Please do not hesitate to call if I could be of any assistance.     Renetta Boogie MD, MD, FACC

## 2019-02-19 NOTE — ASSESSMENT & PLAN NOTE
Patient has chronic hypertension.  Her blood pressure has been under good control on current regimen.  Blood pressure often will go lower after dialysis.  I am reluctant to give her more beta-blocker concerning that may affect her hemodialysis session.  We will continue current medication without changes.  Patient is diagnosed frequently with her living by herself.  We discussed low-sodium diet.

## 2019-02-19 NOTE — ASSESSMENT & PLAN NOTE
Patient is noted to have frequent atrial ectopy seen during dialysis.  She has long-standing history of palpitation correlated with anxiety.  4 days monitor showed frequent PACs with total burden of 3.5%.  There is no complex atrial tachyarrhythmia.  PAC's correlated poorly with her symptoms.  Echocardiogram showed only mildly dilated left atria.  We certainly are concern of possible atrial fibrillation but with frequent PAC, implantable loop monitor would likely generate frequent false positive finding for atrial fibrillation.  She is also noted to have ectopic atrial rhythm.  Although no significant bradycardia noted on monitor, I am concerned of using ahigher dose of beta-blocker may result of bradyarrhythmia.  Given the overall burden of PAC is not that high, I would favor continue on current dose of carvedilol.  We will continue to closely monitor for ectopies.  Once patient is diagnosed with atrial fibrillation, we will need to consider use of warfarin as anticoagulation.  We also discussed that atrial tachyarrhythmia would likely worsened as she gets older.  We likely would revisit the management of her frequent atrial ectopies in the near future.  Patient is advised to call if she is suffering palpitation, dizziness, syncope, or presyncope.  All of the questions were answered to her satisfaction.

## 2019-02-19 NOTE — LETTER
February 19, 2019     Alex Monique MD  [address] [facility] [address]    Patient: Chica Hurd   YOB: 1940   Date of Visit: 2/19/2019       Dear Dr. Monique:    Thank you for referring Chica Hurd to me for evaluation. Below are my notes for this consultation.    If you have questions, please do not hesitate to call me. I look forward to following your patient along with you.         Sincerely,        Eliazar. MD Keagan        CC: DO Christal Goel MD Wong, Kar-Lai, MD  2/19/2019 10:07 AM  Signed     Electrophysiology Office Note  Eliazar Boogie MD [facility]  [phone] (P)  [phone] (F)   Chica Hurd  YOB: 1940  Date of visit: 2/19/2019        Chica Hurd is a 78 y.o. female who is referred to see me today for evaluation and management of frequent atrial ectopies.    Patient carries history of hypertension, COPD, and end-stage renal disease.  She has been on dialysis for 1 year.  While on dialysis, patient was noted to have frequent atrial ectopies. Patient complained of palpitation only when she is anxious especially when staff at dialysis center checks on her dialysis status.  She denies syncope or presyncope.  She has been complaining of vertigo symptom for which she has been placed on meclizine.  At baseline, she lives by herself.  She complains of fatigue after dialysis.  She managed to cook and clean.  Her cousins live close by is her support.  She was referred to see Dr. Hughes for evaluation.  4 days monitor was ordered.  Patient was noted to have frequent PAC with up to 4 beats of atrial tachycardia.  She was asymptomatic during the monitor.      Patient Active Problem List   Diagnosis   • Family history of early CAD   • Hypertension   • Disequilibrium   • ESRD on hemodialysis (CMS/[facility]) ([facility])   • RA (rheumatoid arthritis) (CMS/[facility]) ([facility])   • Vertigo   • MDD (major depressive disorder)   • Abnormal EKG   • Ectopic atrial rhythm   •  Palpitation     Past Medical History:   Diagnosis Date   • Anemia    • Asthma    • Cataracts, bilateral    • COPD (chronic obstructive pulmonary disease) (CMS/[facility]) ([facility])    • Hypertension    • Lipid disorder    • Pre-diabetes    • Renal disease     ESRD on HD   • Rheumatoid arthritis (CMS/[facility]) ([facility])      Past Surgical History:   Procedure Laterality Date   • AV FISTULA PLACEMENT Left 2018    LUE fistula   • CATARACT EXTRACTION W/  INTRAOCULAR LENS IMPLANT  2013   • HEMORRHOID SURGERY     • HYSTERECTOMY     • TOTAL HIP ARTHROPLASTY Bilateral    • TYMPANOPLASTY       Social History   Substance Use Topics   • Smoking status: Former Smoker     Packs/day: 0.25     Quit date: 1981   • Smokeless tobacco: Never Used   • Alcohol use No     Family History   Problem Relation Age of Onset   • Heart failure Father    • Heart attack Father 57   • Hypertension Father    • Asthma Mother    • Heart attack Mother 47   • Diabetes Sister    • Heart attack Brother      Allergies:  Penicillins      Current Outpatient Prescriptions:   •  acetaminophen (TYLENOL EXTRA STRENGTH) 500 mg tablet, Take 500 mg by mouth every 12 (twelve) hours., Disp: , Rfl:   •  albuterol 2.5 mg /3 mL (0.083 %) nebulizer solution, Us as directed, Disp: , Rfl:   •  budesonide-formoterol (SYMBICORT) 160-4.5 mcg/actuation inhaler, Inhale 1 puff 2 (two) times a day.  , Disp: , Rfl:   •  carvedilol (COREG) 12.5 mg tablet, Take 6.25 mg by mouth 2 (two) times a day with meals.  , Disp: , Rfl:   •  cetirizine (ZyrTEC) 10 mg tablet, Take 10 mg by mouth daily., Disp: , Rfl:   •  docusate sodium (COLACE) 100 mg capsule, Take 100 mg by mouth nightly., Disp: , Rfl:   •  escitalopram (LEXAPRO) 10 mg tablet, Take 5 mg by mouth daily.  , Disp: , Rfl:   •  fluticasone (FLONASE) 50 mcg/actuation nasal spray, Administer 1 spray into each nostril daily.  , Disp: , Rfl:   •  folic acid (FOLVITE) 1 mg tablet, Take 1 mg by mouth daily., Disp: , Rfl:   •  furosemide (LASIX) 80 mg  "tablet, Take 40 mg by mouth daily.  , Disp: , Rfl:   •  irbesartan (AVAPRO) 75 mg tablet, Take 37.5 mg by mouth daily.  , Disp: , Rfl:   •  LORazepam (ATIVAN) 0.5 mg tablet, Take 0.5 mg by mouth 2 (two) times a day as needed.  , Disp: , Rfl:   •  meclizine (ANTIVERT) 12.5 mg tablet, Take 1-2 tablets by mouth every 6 hours as needed, Disp: , Rfl:   •  predniSONE (DELTASONE) 5 mg tablet, Take 5 mg by mouth daily.  , Disp: , Rfl:   •  sevelamer (RENVELA) 800 mg tablet, TAKE 1 TABLET BY ORAL ROUTE 3 TIMES EVERY DAY WITH FOOD, Disp: , Rfl: 3    Review of Systems   Constitution: Negative for chills, decreased appetite, fever and weight loss.   HENT: Negative for congestion and ear pain.    Eyes: Negative for double vision and visual disturbance.   Cardiovascular: Positive for dyspnea on exertion and palpitations. Negative for chest pain, leg swelling, near-syncope, orthopnea and syncope.   Respiratory: Positive for shortness of breath. Negative for cough and wheezing.    Endocrine: Negative for cold intolerance and heat intolerance.   Hematologic/Lymphatic: Negative for adenopathy and bleeding problem.   Skin: Negative for rash.   Musculoskeletal: Positive for arthritis, back pain and joint pain. Negative for muscle weakness.   Gastrointestinal: Positive for heartburn. Negative for abdominal pain, anorexia, diarrhea, melena, nausea and vomiting.   Genitourinary: Negative for dysuria and nocturia.   Neurological: Negative for focal weakness, headaches, loss of balance, numbness, seizures and tremors.   Psychiatric/Behavioral: Negative for altered mental status, substance abuse and suicidal ideas. The patient has insomnia and is nervous/anxious.      Objective   Vitals:    02/19/19 0848   BP: 120/70   BP Location: Right upper arm   Patient Position: Sitting   Pulse: 70   Resp: 16   Weight: 89.5 kg (197 lb 6.4 oz)   Height: 1.562 m (5' 1.5\")     Physical Exam   Constitutional: She is oriented to person, place, and time. She " appears well-developed and well-nourished.   HENT:   Head: Normocephalic and atraumatic.   Eyes: EOM are normal. Pupils are equal, round, and reactive to light.   Neck: Normal range of motion. Neck supple.   Cardiovascular: Normal rate and regular rhythm.   Occasional extrasystoles are present. Exam reveals gallop and S4.    2/6 BALA at RUSB rad to LUSB   Pulmonary/Chest: Effort normal and breath sounds normal.   Abdominal: Soft. Bowel sounds are normal.   Musculoskeletal: Normal range of motion. She exhibits no edema.   AV fistula at left forearm; Non pitting pedal edema   Neurological: She is alert and oriented to person, place, and time.   Skin: Skin is warm and dry.   Psychiatric: She has a normal mood and affect.      EKG:  Ectopic atrial rhythm at 70 bpm.  Otherwise normal EKG.    Echocardiogram:   7/30/2018  Normal LV size and function.  Impaired relaxation.  Normal RV size and function.  Normal left and right atria.  Sclerotic trileaflet aortic valve.  Mean gradient across aortic valve 11 mmHg.  ANGELA 1.6 cm² of mild calcification.  Mild mitral annular calcification with trace to at most mild mitral regurgitation.  Mild TR with PASP 41 mmHg.  No pericardial effusion.    Event monitor:  1/15/2019 (4 days 14 hour monitor)  Sinus rhythm with heart rate of 51 bpm to 110 bpm.  Frequent ectopies with total burden 3.6%.  Rare episodes of nonsustained atrial tachycardia longest 4 beats.  Occasional PVC with 4 beats of nonsustained VT.     Assessment   Hypertension  Patient has chronic hypertension.  Her blood pressure has been under good control on current regimen.  Blood pressure often will go lower after dialysis.  I am reluctant to give her more beta-blocker concerning that may affect her hemodialysis session.  We will continue current medication without changes.  Patient is diagnosed frequently with her living by herself.  We discussed low-sodium diet.    Palpitation  Patient is noted to have frequent atrial ectopy  seen during dialysis.  She has long-standing history of palpitation correlated with anxiety.  4 days monitor showed frequent PACs with total burden of 3.5%.  There is no complex atrial tachyarrhythmia.  PAC's correlated poorly with her symptoms.  Echocardiogram showed only mildly dilated left atria.  We certainly are concern of possible atrial fibrillation but with frequent PAC, implantable loop monitor would likely generate frequent false positive finding for atrial fibrillation.  She is also noted to have ectopic atrial rhythm.  Although no significant bradycardia noted on monitor, I am concerned of using ahigher dose of beta-blocker may result of bradyarrhythmia.  Given the overall burden of PAC is not that high, I would favor continue on current dose of carvedilol.  We will continue to closely monitor for ectopies.  Once patient is diagnosed with atrial fibrillation, we will need to consider use of warfarin as anticoagulation.  We also discussed that atrial tachyarrhythmia would likely worsened as she gets older.  We likely would revisit the management of her frequent atrial ectopies in the near future.  Patient is advised to call if she is suffering palpitation, dizziness, syncope, or presyncope.  All of the questions were answered to her satisfaction.            It is my pleasure to be involved in this patient's care.  Please do not hesitate to call if I could be of any assistance.     Renetta Boogie MD, MD, FACC

## 2019-07-03 ENCOUNTER — TELEPHONE (OUTPATIENT)
Dept: CARDIOLOGY | Facility: CLINIC | Age: 79
End: 2019-07-03

## 2019-07-03 NOTE — TELEPHONE ENCOUNTER
LM for Chica that appt on 7- needs to be rescheduled to next avail. (Dr Pack hoptal week)  Asked that she call back. thx

## 2020-03-16 ENCOUNTER — TELEPHONE (OUTPATIENT)
Dept: CARDIOLOGY | Facility: CLINIC | Age: 80
End: 2020-03-16

## 2020-03-16 NOTE — TELEPHONE ENCOUNTER
Called Chica to confirm appt for Tues 3-17-20 and also to ask travel screening questions, asked that she call back. thx.

## 2020-03-18 ENCOUNTER — TELEPHONE (OUTPATIENT)
Dept: CARDIOLOGY | Facility: CLINIC | Age: 80
End: 2020-03-18

## 2020-06-02 DIAGNOSIS — R06.02 SOB (SHORTNESS OF BREATH): Primary | ICD-10-CM

## 2020-09-29 ENCOUNTER — TELEPHONE (OUTPATIENT)
Dept: SCHEDULING | Facility: CLINIC | Age: 80
End: 2020-09-29

## 2020-09-29 NOTE — TELEPHONE ENCOUNTER
Called to priy[facility] Coto for appt for an ECHO and Possibly for Ronit on a Tuesday PM.   Dr Pack's  next avail is 12-1-20.   Got GEMA HERNANDEZ for her to call us back. THX

## 2020-09-29 NOTE — TELEPHONE ENCOUNTER
Pt is calling to r/s her echo and OV. Pt states she has dialysis on Mon wed and Friday. Pt would like to know I she can get both appts on the same day on either Tuesday or Thursday. Pt can be reached back at [phone] . Pt states ok to lvm with appt info

## 2020-11-17 ENCOUNTER — OFFICE VISIT (OUTPATIENT)
Dept: CARDIOLOGY | Facility: CLINIC | Age: 80
End: 2020-11-17
Payer: COMMERCIAL

## 2020-11-17 VITALS
WEIGHT: 173.4 LBS | BODY MASS INDEX: 32.23 KG/M2 | DIASTOLIC BLOOD PRESSURE: 68 MMHG | SYSTOLIC BLOOD PRESSURE: 150 MMHG | HEART RATE: 60 BPM

## 2020-11-17 DIAGNOSIS — I49.1 ECTOPIC ATRIAL RHYTHM: Primary | ICD-10-CM

## 2020-11-17 DIAGNOSIS — I10 ESSENTIAL HYPERTENSION: ICD-10-CM

## 2020-11-17 PROCEDURE — 93000 ELECTROCARDIOGRAM COMPLETE: CPT | Performed by: NURSE PRACTITIONER

## 2020-11-17 PROCEDURE — 99214 OFFICE O/P EST MOD 30 MIN: CPT | Performed by: NURSE PRACTITIONER

## 2020-11-17 RX ORDER — PANTOPRAZOLE SODIUM 40 MG/1
40 TABLET, DELAYED RELEASE ORAL 2 TIMES DAILY
COMMUNITY
End: 2022-11-15

## 2020-11-18 PROBLEM — Z01.810 PREOPERATIVE CARDIOVASCULAR EXAMINATION: Status: ACTIVE | Noted: 2020-11-18

## 2020-11-18 ASSESSMENT — ENCOUNTER SYMPTOMS
NEAR-SYNCOPE: 0
MYALGIAS: 0
SYNCOPE: 0
NAUSEA: 0
SNORING: 0
DIZZINESS: 0
PALPITATIONS: 0
DYSPNEA ON EXERTION: 1
COUGH: 0
ANOREXIA: 0
BLURRED VISION: 0
DISTURBANCES IN COORDINATION: 0
HEMATURIA: 0
HEADACHES: 0
WEAKNESS: 0
CLAUDICATION: 0
IRREGULAR HEARTBEAT: 0
ORTHOPNEA: 0
WHEEZING: 0
SHORTNESS OF BREATH: 0
NERVOUS/ANXIOUS: 0
WEIGHT LOSS: 1
PND: 0
HEARTBURN: 0
WEIGHT GAIN: 0
DYSURIA: 0
ABDOMINAL PAIN: 0
NIGHT SWEATS: 0

## 2020-11-18 NOTE — PROGRESS NOTES
HPI   Chica Hurd presents to the office today for a pre-operative cardiovascular evaluation prior to EGD removal of a large duodenal polyp.  Chica was admitted to [facility] in September with a gastric ulcer related GI bleed.  She was found to have H pylori and was treated with antibiotics.  She is currently taking pantoprazole 40 mg twice a day.  She did have an EGD on  and a large duodenal polyp was found.  Biopsy revealed tubular adenoma.  She is scheduled for removal endoscopically under general anesthesia.  Today, Chica tells me that she has been feeling pretty good.  She denies complaints of chest discomfort but does get LADD, especially if she has been sedentary.    She tells me that prior to the pandemic, she was going to the  and participating in water aerobics and other senior exercise programs.  She currently walks around her apartment complex and does some steps  As directed by PT.    She does do her own shopping, cooking and cleaning but admits that she is now getting tired.  Her family wants her to move to [place] to be near them.  She does have a cousin nearby that she is very close to.       Medical History: has a past medical history of Anemia, Asthma, Cataracts, bilateral, COPD (chronic obstructive pulmonary disease) (CMS/[facility]), Hypertension, Lipid disorder, Pre-diabetes, Renal disease, and Rheumatoid arthritis (CMS/[facility]).    Surgical History: has a past surgical history that includes Hysterectomy; Total hip arthroplasty (Bilateral); Tympanoplasty; AV fistula placement (Left, 2018); Cataract extraction w/  intraocular lens implant (); and Hemorrhoid surgery.    Social History: reports that she quit smoking about 39 years ago. She smoked 0.25 packs per day. She has never used smokeless tobacco. She reports that she does not drink alcohol or use drugs.    Family History: She indicated that her biological mother is . She indicated that her biological  father is . She indicated that her biological sister is . She indicated that her biological brother is .           Allergies:Penicillins    Current Medications:  Current Outpatient Medications:   •  acetaminophen (TYLENOL EXTRA STRENGTH) 500 mg tablet, Take 500 mg by mouth every 12 (twelve) hours., Disp: , Rfl:   •  albuterol 2.5 mg /3 mL (0.083 %) nebulizer solution, Us as directed, Disp: , Rfl:   •  budesonide-formoterol (SYMBICORT) 160-4.5 mcg/actuation inhaler, Inhale 1 puff 2 (two) times a day.  , Disp: , Rfl:   •  carvedilol (COREG) 12.5 mg tablet, Take 12.5 mg by mouth 2 (two) times a day with meals. , Disp: , Rfl:   •  cetirizine (ZyrTEC) 10 mg tablet, Take 10 mg by mouth daily., Disp: , Rfl:   •  docusate sodium (COLACE) 100 mg capsule, Take 100 mg by mouth nightly., Disp: , Rfl:   •  escitalopram (LEXAPRO) 10 mg tablet, Take 5 mg by mouth daily.  , Disp: , Rfl:   •  fluticasone (FLONASE) 50 mcg/actuation nasal spray, Administer 1 spray into each nostril daily.  , Disp: , Rfl:   •  folic acid (FOLVITE) 1 mg tablet, Take 1 mg by mouth daily., Disp: , Rfl:   •  furosemide (LASIX) 80 mg tablet, Take 80 mg by mouth daily. On non-dialysis days , Disp: , Rfl:   •  irbesartan (AVAPRO) 75 mg tablet, Take 37.5 mg by mouth daily.  , Disp: , Rfl:   •  LORazepam (ATIVAN) 0.5 mg tablet, Take 0.5 mg by mouth 2 (two) times a day as needed.  , Disp: , Rfl:   •  meclizine (ANTIVERT) 12.5 mg tablet, Take 1-2 tablets by mouth every 6 hours as needed, Disp: , Rfl:   •  pantoprazole (PROTONIX) 40 mg EC tablet, Take 40 mg by mouth 2 times daily., Disp: , Rfl:   •  predniSONE (DELTASONE) 5 mg tablet, Take 5 mg by mouth daily.  , Disp: , Rfl:   •  sevelamer (RENVELA) 800 mg tablet, TAKE 1 TABLET BY ORAL ROUTE 3 TIMES EVERY DAY WITH FOOD, Disp: , Rfl: 3         Review of Systems   Constitution: Positive for weight loss. Negative for malaise/fatigue, night sweats and weight gain.   HENT: Negative for  nosebleeds.    Eyes: Negative for blurred vision.   Cardiovascular: Positive for dyspnea on exertion. Negative for chest pain, claudication, cyanosis, irregular heartbeat, leg swelling, near-syncope, orthopnea, palpitations, paroxysmal nocturnal dyspnea and syncope.   Respiratory: Negative for cough, shortness of breath, snoring and wheezing.    Skin: Negative for rash.   Musculoskeletal: Positive for arthritis. Negative for myalgias.   Gastrointestinal: Negative for abdominal pain, anorexia, dysphagia, heartburn and nausea.   Genitourinary: Negative for dysuria, hematuria and nocturia.   Neurological: Negative for disturbances in coordination, dizziness, headaches and weakness.   Psychiatric/Behavioral: The patient is not nervous/anxious.    Allergic/Immunologic: Negative for HIV exposure.     Vitals:    11/17/20 1411   BP: (!) 150/68   BP Location: Right upper arm   Patient Position: Sitting   Pulse: 60   Weight: 78.7 kg (173 lb 6.4 oz)     Physical Exam   Constitutional: She appears well-developed and well-nourished. No distress.   HENT:   Head: Normocephalic and atraumatic.   Eyes: Pupils are equal, round, and reactive to light. Conjunctivae are normal.   Neck: Normal range of motion. Neck supple. No JVD present.   Cardiovascular: Normal rate and regular rhythm. Exam reveals gallop (S4).   Murmur (II/VI SM at the right and left SB) heard.  AV Graft L arm   Pulmonary/Chest: Effort normal and breath sounds normal. No respiratory distress. She has no wheezes. She has no rales.   Abdominal: Soft.   Musculoskeletal: Normal range of motion.   Neurological: She is alert.   Skin: Skin is warm and dry. No rash noted. She is not diaphoretic. No erythema. No pallor.   Psychiatric: She has a normal mood and affect. Her behavior is normal. Judgment and thought content normal.              ECG:  Sinus bradycardia, voltage criteria for LVH with non-specific ST-T wave abnormalities.  Rate 58; QTc.  423 msecs.    Preoperative  cardiovascular examination  Chica had an echocardiogram in October, 2020 that revealed mild concentric LVH with an LVEF of 60-65%.  There was mild diastolic dysfunction and normal wall motion.  Her RV was normal in size and function.  She has mild AI and MR; mild to moderate TR.  She denies anginal symptoms and there has been no change in her functional status.  Chica is high risk given her age and co-morbidities, however, is able to proceed with her procedure.  She is not on aspirin or blood thinners and was instructed to take her medication as instructed by GI.    Hypertension  Chica's blood pressure is mildly elevated in the office today.  She tells me that she monitors it at home and is usually lower.  She will continue her carvedilol 12.5 mg twice a day, irbesartan 75 mg daily and lasix 80 mg on non-dialysis days.  Chica has lost 20 lbs since her last visit in January, 2019 and she tells me that she has changed her diet.  She is trying to avoid high sodium foods and has reduced her portion size.    Ectopic atrial rhythm  Chica has been evaluated by EP and it was recommended that she continue with her carvedilol.  She does have frequent PAC's but has not had documented atrial fibrillation.  She is monitored during her dialysis sessions.           The above information was discussed with Dr. Hughes who agrees that Chica can proceed with her procedure.      LETY Botello   11/18/2020  1:22 PM

## 2020-11-18 NOTE — ASSESSMENT & PLAN NOTE
Chica has been evaluated by EP and it was recommended that she continue with her carvedilol.  She does have frequent PAC's but has not had documented atrial fibrillation.  She is monitored during her dialysis sessions.

## 2020-11-18 NOTE — ASSESSMENT & PLAN NOTE
Chica had an echocardiogram in October, 2020 that revealed mild concentric LVH with an LVEF of 60-65%.  There was mild diastolic dysfunction and normal wall motion.  Her RV was normal in size and function.  She has mild AI and MR; mild to moderate TR.  She denies anginal symptoms and there has been no change in her functional status.  Chica is high risk given her age and co-morbidities, however, is able to proceed with her procedure.  She is not on aspirin or blood thinners and was instructed to take her medication as instructed by GI.

## 2020-11-18 NOTE — ASSESSMENT & PLAN NOTE
Chica's blood pressure is mildly elevated in the office today.  She tells me that she monitors it at home and is usually lower.  She will continue her carvedilol 12.5 mg twice a day, irbesartan 75 mg daily and lasix 80 mg on non-dialysis days.  Chica has lost 20 lbs since her last visit in January, 2019 and she tells me that she has changed her diet.  She is trying to avoid high sodium foods and has reduced her portion size.

## 2021-09-28 ENCOUNTER — TELEPHONE (OUTPATIENT)
Dept: SCHEDULING | Facility: CLINIC | Age: 81
End: 2021-09-28

## 2021-09-28 NOTE — TELEPHONE ENCOUNTER
Pt calling to set up an appt with Dr. Hughes for eval of Shortness of breath and Coughing for several weeks.     Pt states she was in [facility] a few weeks ago and had Negative COVID test for [place] out.    Pt would prefer the [facility] office for an appt.     No appt to offer.     Pt can be reached @  [phone].     TY

## 2021-10-07 ENCOUNTER — TELEPHONE (OUTPATIENT)
Dept: CARDIOLOGY | Facility: CLINIC | Age: 81
End: 2021-10-07

## 2021-10-07 ENCOUNTER — OFFICE VISIT (OUTPATIENT)
Dept: CARDIOLOGY | Facility: CLINIC | Age: 81
End: 2021-10-07
Payer: COMMERCIAL

## 2021-10-07 VITALS
WEIGHT: 195 LBS | HEART RATE: 62 BPM | BODY MASS INDEX: 36.25 KG/M2 | DIASTOLIC BLOOD PRESSURE: 76 MMHG | SYSTOLIC BLOOD PRESSURE: 136 MMHG

## 2021-10-07 DIAGNOSIS — I15.0 RENOVASCULAR HYPERTENSION: ICD-10-CM

## 2021-10-07 DIAGNOSIS — I49.1 ECTOPIC ATRIAL RHYTHM: Primary | ICD-10-CM

## 2021-10-07 DIAGNOSIS — R06.09 DYSPNEA ON EXERTION: ICD-10-CM

## 2021-10-07 PROCEDURE — 99214 OFFICE O/P EST MOD 30 MIN: CPT | Performed by: NURSE PRACTITIONER

## 2021-10-07 PROCEDURE — 93000 ELECTROCARDIOGRAM COMPLETE: CPT | Performed by: NURSE PRACTITIONER

## 2021-10-07 PROCEDURE — 3008F BODY MASS INDEX DOCD: CPT | Performed by: NURSE PRACTITIONER

## 2021-10-07 RX ORDER — FUROSEMIDE 80 MG/1
40 TABLET ORAL DAILY
COMMUNITY
Start: 2021-10-07 | End: 2022-09-20

## 2021-10-07 ASSESSMENT — ENCOUNTER SYMPTOMS
DIZZINESS: 0
NERVOUS/ANXIOUS: 0
ORTHOPNEA: 0
NAUSEA: 0
HEADACHES: 0
DYSPNEA ON EXERTION: 1
ABDOMINAL PAIN: 0
WEIGHT GAIN: 1
HEARTBURN: 0
WHEEZING: 0
CLAUDICATION: 0
SHORTNESS OF BREATH: 0
ANOREXIA: 0
BLURRED VISION: 0
MYALGIAS: 0
HEMATURIA: 0
SYNCOPE: 0
NEAR-SYNCOPE: 0
PALPITATIONS: 0
NIGHT SWEATS: 0
DYSURIA: 0
PND: 0
SNORING: 0
COUGH: 0
DISTURBANCES IN COORDINATION: 0
WEAKNESS: 0
IRREGULAR HEARTBEAT: 0

## 2021-10-07 NOTE — TELEPHONE ENCOUNTER
Hello. Pt needs a precert for Echo 21 at [facility] 64 [id number]  39.  Thanks Kaylee

## 2021-10-07 NOTE — ASSESSMENT & PLAN NOTE
"I believe Chica's dyspnea is multifactorial.  She has gained 22 lbs since her visit here last year.  She is also not doing her exercises and has become deconditioned.  Her tells me she missed dialysis due to access problems and has had fluid retention related to this but has been resolved.  Her blood pressure is fairly well controlled here today and she tells me it is good during dialysis.  She is aware she needs to lose weight and tells me \" I know what to do\".  She is also going to investigate returning to the [facility].  Chica will return to the office for an echocardiogram.  "

## 2021-10-07 NOTE — ASSESSMENT & PLAN NOTE
Chica's blood pressure is controlled in the office today.  She is currently only taking carvedilol 12.5 mg twice a day.  Her irbesartan was discontinued because of low BP on HD.  She continues to take lasix 40 mg on non dialysis days.

## 2021-10-07 NOTE — PROGRESS NOTES
HPI  Chica Hurd presents to the office today for evaluation of dyspnea on exertion.  She was last here in  for pre-operative clearance prior to removal of a large duodenal polyp.  She tells me she had the procedure and did well.  Chica has noticed she gets winded when she is walking, especially with inclines.  She also tell me she has gained a lot of weight and is eating everything.  Prior to the pandemic, she went to the [facility] 3-4 times a week and did water aerobics and other senior exercise programs.  She has not been doing any exercise with the exception of walking to and from her car.  She denies chest pain; no orthopnea, PND or edema.  She tells me she is probably going to move to [place] next year to be close to her family.       Medical History: has a past medical history of Anemia, Asthma, Cataracts, bilateral, COPD (chronic obstructive pulmonary disease) (CMS/[facility]), Hypertension, Lipid disorder, Pre-diabetes, Renal disease, and Rheumatoid arthritis (CMS/[facility]).    Surgical History: has a past surgical history that includes Hysterectomy; Total hip arthroplasty (Bilateral); Tympanoplasty; AV fistula placement (Left, 2018); Cataract extraction w/  intraocular lens implant (2013); and Hemorrhoid surgery.    Social History: reports that she quit smoking about 40 years ago. She smoked 0.25 packs per day. She has never used smokeless tobacco. She reports that she does not drink alcohol and does not use drugs.    Family History: She indicated that her biological mother is . She indicated that her biological father is . She indicated that her biological sister is . She indicated that her biological brother is .           Allergies:Penicillins    Current Medications:  Current Outpatient Medications:   •  acetaminophen (TYLENOL EXTRA STRENGTH) 500 mg tablet, Take 500 mg by mouth every 12 (twelve) hours., Disp: , Rfl:   •  albuterol 2.5 mg /3 mL (0.083 %)  nebulizer solution, Us as directed, Disp: , Rfl:   •  budesonide-formoterol (SYMBICORT) 160-4.5 mcg/actuation inhaler, Inhale 1 puff 2 (two) times a day.  , Disp: , Rfl:   •  carvedilol (COREG) 12.5 mg tablet, Take 12.5 mg by mouth 2 (two) times a day with meals. , Disp: , Rfl:   •  cetirizine (ZyrTEC) 10 mg tablet, Take 10 mg by mouth daily., Disp: , Rfl:   •  docusate sodium (COLACE) 100 mg capsule, Take 100 mg by mouth nightly., Disp: , Rfl:   •  escitalopram (LEXAPRO) 10 mg tablet, Take 5 mg by mouth daily.  , Disp: , Rfl:   •  fluticasone (FLONASE) 50 mcg/actuation nasal spray, Administer 1 spray into each nostril daily.  , Disp: , Rfl:   •  folic acid (FOLVITE) 1 mg tablet, Take 1 mg by mouth daily., Disp: , Rfl:   •  furosemide (LASIX) 80 mg tablet, Take 0.5 tablets (40 mg total) by mouth daily. On non-dialysis days, Disp: , Rfl:   •  LORazepam (ATIVAN) 0.5 mg tablet, Take 0.5 mg by mouth 2 (two) times a day as needed.  , Disp: , Rfl:   •  meclizine (ANTIVERT) 12.5 mg tablet, Take 1-2 tablets by mouth every 6 hours as needed, Disp: , Rfl:   •  pantoprazole (PROTONIX) 40 mg EC tablet, Take 40 mg by mouth 2 times daily., Disp: , Rfl:   •  predniSONE (DELTASONE) 5 mg tablet, Take 5 mg by mouth daily.  , Disp: , Rfl:   •  sevelamer (RENVELA) 800 mg tablet, TAKE 1 TABLET BY ORAL ROUTE 3 TIMES EVERY DAY WITH FOOD, Disp: , Rfl: 3         Review of Systems   Constitutional: Positive for weight gain. Negative for malaise/fatigue and night sweats.   HENT: Negative for nosebleeds.    Eyes: Negative for blurred vision.   Cardiovascular: Positive for dyspnea on exertion. Negative for chest pain, claudication, cyanosis, irregular heartbeat, leg swelling, near-syncope, orthopnea, palpitations, paroxysmal nocturnal dyspnea and syncope.   Respiratory: Negative for cough, shortness of breath, snoring and wheezing.    Skin: Negative for rash.   Musculoskeletal: Positive for arthritis. Negative for myalgias.        Right  shoulder pain   Gastrointestinal: Negative for abdominal pain, anorexia, dysphagia, heartburn and nausea.   Genitourinary: Negative for dysuria, hematuria and nocturia.   Neurological: Negative for disturbances in coordination, dizziness, headaches and weakness.   Psychiatric/Behavioral: The patient is not nervous/anxious.    Allergic/Immunologic: Negative for HIV exposure.     Vitals:    10/07/21 1321   BP: 136/76   BP Location: Right upper arm   Patient Position: Sitting   Pulse: 62   Weight: 88.5 kg (195 lb)     Physical Exam  Constitutional:       General: She is not in acute distress.     Appearance: Normal appearance. She is not ill-appearing or diaphoretic.   HENT:      Head: Normocephalic and atraumatic.   Eyes:      Conjunctiva/sclera: Conjunctivae normal.   Cardiovascular:      Rate and Rhythm: Normal rate and regular rhythm.      Heart sounds: Murmur (II/VI SM right and LSB) heard.   Gallop (S4) present.       Comments: Right chest wall permacath  Left lower arm AV fistula  Pulmonary:      Effort: Pulmonary effort is normal. No respiratory distress.      Breath sounds: No wheezing, rhonchi or rales.   Abdominal:      Palpations: Abdomen is soft.   Musculoskeletal:         General: Normal range of motion.      Cervical back: Normal range of motion.      Right lower leg: No edema.      Left lower leg: No edema.   Skin:     General: Skin is warm and dry.   Neurological:      General: No focal deficit present.      Mental Status: She is alert and oriented to person, place, and time.   Psychiatric:         Mood and Affect: Mood normal.         Behavior: Behavior normal.         Thought Content: Thought content normal.         Judgment: Judgment normal.                ECG:  NSR, rare PAC, rate 61, QTc. 419 msecs.    Dyspnea on exertion  I believe Chica's dyspnea is multifactorial.  She has gained 22 lbs since her visit here last year.  She is also not doing her exercises and has become deconditioned.  Her  "tells me she missed dialysis due to access problems and has had fluid retention related to this but has been resolved.  Her blood pressure is fairly well controlled here today and she tells me it is good during dialysis.  She is aware she needs to lose weight and tells me \" I know what to do\".  She is also going to investigate returning to the [facility].  Chica will return to the office for an echocardiogram.    Hypertension  Chica's blood pressure is controlled in the office today.  She is currently only taking carvedilol 12.5 mg twice a day.  Her irbesartan was discontinued because of low BP on HD.  She continues to take lasix 40 mg on non dialysis days.           Chica will return to the office for an echocardiogram and she will follow-up in the office with Dr. Hughes.    I spent 30 minutes on this date of service performing the following activities: obtaining history, performing examination, entering orders, documenting, obtaining / reviewing records and providing counseling and education.    LETY Botello   10/7/2021  4:45 PM  "

## 2021-11-04 ENCOUNTER — HOSPITAL ENCOUNTER (OUTPATIENT)
Dept: CARDIOLOGY | Facility: CLINIC | Age: 81
Discharge: HOME | End: 2021-11-04
Attending: INTERNAL MEDICINE
Payer: COMMERCIAL

## 2021-11-04 VITALS
BODY MASS INDEX: 39.31 KG/M2 | SYSTOLIC BLOOD PRESSURE: 140 MMHG | WEIGHT: 195 LBS | DIASTOLIC BLOOD PRESSURE: 78 MMHG | HEIGHT: 59 IN

## 2021-11-04 DIAGNOSIS — R06.09 DYSPNEA ON EXERTION: ICD-10-CM

## 2021-11-04 PROCEDURE — 93306 TTE W/DOPPLER COMPLETE: CPT | Performed by: INTERNAL MEDICINE

## 2021-11-05 LAB
AORTIC ROOT ANNULUS: 3 CM
AORTIC VALVE MEAN VELOCITY: 1.88 M/S
AORTIC VALVE VELOCITY TIME INTEGRAL: 74.6 CM
ASCENDING AORTA: 3.9 CM
AV MEAN GRADIENT: 16 MMHG
AV PEAK GRADIENT: 33 MMHG
AV PEAK VELOCITY-S: 2.87 M/S
AV VALVE AREA: 1.4 CM2
BSA FOR ECHO PROCEDURE: 1.92 M2
DOP CALC LVOT STROKE VOLUME: 101.74 ML
E/A RATIO: 1.2
E/E' RATIO: 23.8
E/LAT E' RATIO: 16.6
EDV (BP): 62.7 CM3
EF (A4C): 68.2 %
EF A2C: 72.7 %
EJECTION FRACTION: 71 %
EST RIGHT VENT SYSTOLIC PRESSURE BY TRICUSPID REGURGITATION JET: 52 MMHG
ESV (BP): 18.2 CM3
FRACTIONAL SHORTENING: 30.4 %
INTERVENTRICULAR SEPTUM: 1 CM
LA ESV (BP): 111 CM3
LA ESV INDEX (A2C): 56.25 CM3/M2
LA ESV INDEX (BP): 57.81 CM3/M2
LA/AORTA RATIO: 1.73
LAAS-AP2: 30.8 CM2
LAAS-AP4: 32.3 CM2
LAD 2D: 5.2 CM
LALD A4C: 7.18 CM
LALD A4C: 7.49 CM
LAV-S: 108 CM3
LEFT ATRIUM VOLUME INDEX: 53.18 CM3/M2
LEFT ATRIUM VOLUME: 102.1 CM3
LEFT INTERNAL DIMENSION IN SYSTOLE: 3.43 CM (ref 2.53–3.83)
LEFT VENTRICLE DIASTOLIC VOLUME INDEX: 35.83 CM3/M2
LEFT VENTRICLE DIASTOLIC VOLUME: 68.8 CM3
LEFT VENTRICLE SYSTOLIC VOLUME INDEX: 11.41 CM3/M2
LEFT VENTRICLE SYSTOLIC VOLUME: 21.9 CM3
LEFT VENTRICULAR INTERNAL DIMENSION IN DIASTOLE: 4.82 CM (ref 4.28–5.94)
LEFT VENTRICULAR POSTERIOR WALL IN END DIASTOLE: 1.09 CM (ref 0.56–1.04)
LV DIASTOLIC VOLUME: 55.2 CM3
LV ESV (APICAL 2 CHAMBER): 15.1 CM3
LVAD-AP2: 22.9 CM2
LVAD-AP4: 25.1 CM2
LVAS-AP2: 9.68 CM2
LVAS-AP4: 12.1 CM2
LVEDVI(A2C): 28.75 CM3/M2
LVEDVI(BP): 32.66 CM3/M2
LVESVI(A2C): 7.86 CM3/M2
LVESVI(BP): 9.48 CM3/M2
LVLD-AP2: 7.83 CM
LVLD-AP4: 7.52 CM
LVLS-AP2: 6.14 CM
LVLS-AP4: 5.82 CM
LVOT 2D: 1.9 CM
LVOT A: 2.84 CM2
LVOT MG: 5 MMHG
LVOT MV: 1.09 M/S
LVOT PEAK VELOCITY: 1.57 M/S
LVOT VTI: 35.9 CM
MITRAL VALVE MEAN INFLOW VELOCITY: 4.81 M/S
MR PISA EROA: 0.19 CM2
MR VELOCITY: 6.35 M/S
MR VTI: 228 CM
MV D: 3.4 CM
MV E'TISSUE VEL-LAT: 0.08 M/S
MV E'TISSUE VEL-MED: 0.06 M/S
MV MEAN GRADIENT: 3 MMHG
MV PEAK A VEL: 1.14 M/S
MV PEAK E VEL: 1.38 M/S
MV PEAK GRADIENT: 11 MMHG
MV REGURGITANT FRACTION: 9 %
MV REGURGITANT VOLUME: 43 CM3
MV VALVE AREA BY CONTINUITY EQUATION: 1.93 CM2
MV VALVE AREA P 1/2 METHOD: 3.19 CM2
MV VTI: 52.8 CM
PISA ALIAS VEL MV: 0.39 M/S
PISA RADIUS: 0.7 CM
POSTERIOR WALL: 1.09 CM
PULMONARY REGURGITATION LATE DIASTOLIC VELOCITY: 1.39 M/S
PV PEAK GRADIENT: 7 MMHG
PV PV: 1.35 M/S
RAP: 7 MMHG
RVOT VMAX: 1.07 M/S
RVOT VTI: 24.6 CM
SEPTAL TISSUE DOPPLER FREE WALL LATE DIA VELOCITY (APICAL 4 CHAMBER VIEW): 0.14 M/S
TAPSE: 1.89 CM
TR MAX PG: 45 MMHG
TRICUSPID VALVE PEAK REGURGITATION VELOCITY: 3.37 M/S
Z-SCORE OF LEFT VENTRICULAR DIMENSION IN END DIASTOLE: -0.44
Z-SCORE OF LEFT VENTRICULAR DIMENSION IN END SYSTOLE: 0.77
Z-SCORE OF LEFT VENTRICULAR POSTERIOR WALL IN END DIASTOLE: 1.85

## 2021-11-30 ENCOUNTER — APPOINTMENT (RX ONLY)
Dept: URBAN - METROPOLITAN AREA CLINIC 28 | Facility: CLINIC | Age: 81
Setting detail: DERMATOLOGY
End: 2021-11-30

## 2021-11-30 DIAGNOSIS — D18.0 HEMANGIOMA: ICD-10-CM

## 2021-11-30 DIAGNOSIS — D22 MELANOCYTIC NEVI: ICD-10-CM

## 2021-11-30 DIAGNOSIS — L738 OTHER SPECIFIED DISEASES OF HAIR AND HAIR FOLLICLES: ICD-10-CM

## 2021-11-30 DIAGNOSIS — L57.8 OTHER SKIN CHANGES DUE TO CHRONIC EXPOSURE TO NONIONIZING RADIATION: ICD-10-CM

## 2021-11-30 DIAGNOSIS — L81.4 OTHER MELANIN HYPERPIGMENTATION: ICD-10-CM

## 2021-11-30 DIAGNOSIS — L82.1 OTHER SEBORRHEIC KERATOSIS: ICD-10-CM

## 2021-11-30 DIAGNOSIS — L663 OTHER SPECIFIED DISEASES OF HAIR AND HAIR FOLLICLES: ICD-10-CM

## 2021-11-30 DIAGNOSIS — L73.9 FOLLICULAR DISORDER, UNSPECIFIED: ICD-10-CM

## 2021-11-30 PROBLEM — D22.9 MELANOCYTIC NEVI, UNSPECIFIED: Status: ACTIVE | Noted: 2021-11-30

## 2021-11-30 PROBLEM — D18.01 HEMANGIOMA OF SKIN AND SUBCUTANEOUS TISSUE: Status: ACTIVE | Noted: 2021-11-30

## 2021-11-30 PROBLEM — L02.92 FURUNCLE, UNSPECIFIED: Status: ACTIVE | Noted: 2021-11-30

## 2021-11-30 PROCEDURE — 99213 OFFICE O/P EST LOW 20 MIN: CPT

## 2021-11-30 PROCEDURE — ? PHOTO-DOCUMENTATION

## 2021-11-30 PROCEDURE — ? COUNSELING

## 2021-11-30 PROCEDURE — ? PRESCRIPTION MEDICATION MANAGEMENT

## 2021-11-30 PROCEDURE — ? PRESCRIPTION

## 2021-11-30 PROCEDURE — ? FULL BODY SKIN EXAM

## 2021-11-30 RX ORDER — TRIAMCINOLONE ACETONIDE 1 MG/G
CREAM TOPICAL QDAY
Qty: 80 | Refills: 0 | Status: ERX | COMMUNITY
Start: 2021-11-30

## 2021-11-30 RX ADMIN — TRIAMCINOLONE ACETONIDE: 1 CREAM TOPICAL at 00:00

## 2021-11-30 NOTE — PROCEDURE: PRESCRIPTION MEDICATION MANAGEMENT
Detail Level: Zone
Initiate Treatment: triamcinolone acetonide 0.1 % topical cream: Apply to AA of arms, thighs and chest QDAY
Render In Strict Bullet Format?: No

## 2021-12-07 ENCOUNTER — TELEPHONE (OUTPATIENT)
Dept: SCHEDULING | Facility: CLINIC | Age: 81
End: 2021-12-07
Payer: COMMERCIAL

## 2021-12-07 ENCOUNTER — TELEPHONE (OUTPATIENT)
Dept: CARDIOLOGY | Facility: CLINIC | Age: 81
End: 2021-12-07
Payer: COMMERCIAL

## 2021-12-07 NOTE — TELEPHONE ENCOUNTER
I think this is due to her weight gain and fluid retention.  It may need to lower her dry weight at dialysis, but I know she had access problems.  A low-dose of valsartan would be helpful on nondialysis days.  She should make a follow-up appointment.  Thank you

## 2021-12-07 NOTE — TELEPHONE ENCOUNTER
Test Results     Name of caller: pt    Name of patient: Chica Hurd    Name of physician: Christal Olvera MD     Type of test: TTE    Best contact number: [phone]

## 2021-12-07 NOTE — TELEPHONE ENCOUNTER
I returned a call to Chica to review her echo results.  She tells me she continues to have LADD - she has gained weight during the pandemic and is not exercising like she did before.  Her echocardiogram shows an increase in her MR and elevated filling pressures.  She is on coreg 12.5 mg daily, lasix 40 mg on non-dialysis days but no longer on an ARB.  I'm not quite sure what happened  - and I'll check to see if she knows, but she was on valsartan for a long time and most recently on olmesartan.  Would you consider resuming a low dose on non-dialysis days?  Her BP here was 136/76

## 2021-12-10 NOTE — TELEPHONE ENCOUNTER
I called Chica on Wednesday and LM for her to call me back.  I called again today, seems her mail box is full.  I'll try her again later.

## 2022-04-26 ENCOUNTER — RX ONLY (OUTPATIENT)
Age: 82
Setting detail: RX ONLY
End: 2022-04-26

## 2022-04-26 ENCOUNTER — APPOINTMENT (RX ONLY)
Dept: URBAN - METROPOLITAN AREA CLINIC 28 | Facility: CLINIC | Age: 82
Setting detail: DERMATOLOGY
End: 2022-04-26

## 2022-04-26 DIAGNOSIS — L738 OTHER SPECIFIED DISEASES OF HAIR AND HAIR FOLLICLES: ICD-10-CM | Status: WELL CONTROLLED

## 2022-04-26 DIAGNOSIS — L63.8 OTHER ALOPECIA AREATA: ICD-10-CM

## 2022-04-26 DIAGNOSIS — L73.9 FOLLICULAR DISORDER, UNSPECIFIED: ICD-10-CM | Status: WELL CONTROLLED

## 2022-04-26 DIAGNOSIS — L663 OTHER SPECIFIED DISEASES OF HAIR AND HAIR FOLLICLES: ICD-10-CM | Status: WELL CONTROLLED

## 2022-04-26 DIAGNOSIS — B02.9 ZOSTER WITHOUT COMPLICATIONS: ICD-10-CM

## 2022-04-26 PROBLEM — L02.92 FURUNCLE, UNSPECIFIED: Status: ACTIVE | Noted: 2022-04-26

## 2022-04-26 PROCEDURE — ? COUNSELING

## 2022-04-26 PROCEDURE — 99214 OFFICE O/P EST MOD 30 MIN: CPT

## 2022-04-26 PROCEDURE — ? DIAGNOSIS COMMENT

## 2022-04-26 PROCEDURE — ? PRESCRIPTION MEDICATION MANAGEMENT

## 2022-04-26 RX ORDER — TRIAMCINOLONE ACETONIDE 1 MG/G
CREAM TOPICAL QDAY
Qty: 80 | Refills: 0 | Status: ERX

## 2022-04-26 ASSESSMENT — LOCATION ZONE DERM
LOCATION ZONE: TRUNK
LOCATION ZONE: TRUNK

## 2022-04-26 ASSESSMENT — LOCATION DETAILED DESCRIPTION DERM
LOCATION DETAILED: T12 LEFT POSTERIOR DERMATOME
LOCATION DETAILED: T9 LEFT ANTERIOR DERMATOME
LOCATION DETAILED: T11 LEFT POSTERIOR DERMATOME
LOCATION DETAILED: T11 LEFT ANTERIOR DERMATOME
LOCATION DETAILED: PERIUMBILICAL SKIN

## 2022-04-26 ASSESSMENT — LOCATION SIMPLE DESCRIPTION DERM
LOCATION SIMPLE: T11 LEFT POSTERIOR DERMATOME
LOCATION SIMPLE: T11 LEFT ANTERIOR DERMATOME
LOCATION SIMPLE: T9 LEFT ANTERIOR DERMATOME
LOCATION SIMPLE: ABDOMEN
LOCATION SIMPLE: T12 LEFT POSTERIOR DERMATOME

## 2022-04-26 NOTE — HPI: HAIR LOSS
How Did The Hair Loss Occur?: gradual in onset
How Severe Is Your Hair Loss?: mild
What Hair Products Do You Use?: It varies
Additional History: Patient is on dialysis for kidney disease.

## 2022-04-26 NOTE — PROCEDURE: PRESCRIPTION MEDICATION MANAGEMENT
Detail Level: Zone
Continue Regimen: triamcinolone acetonide 0.1 % topical cream: Apply to AA of arms, thighs and chest PRN flare
Render In Strict Bullet Format?: No
Initiate Treatment: triamcinolone acetonide 0.1% topical cream: Apply to AA of stomach Bid.

## 2022-04-26 NOTE — HPI: RASH
What Type Of Note Output Would You Prefer (Optional)?: Bullet Format
How Severe Is Your Rash?: mild
Is This A New Presentation, Or A Follow-Up?: Rash
Additional History: Pt was treated for shingles in December and is worried about left over rash.

## 2022-07-21 ENCOUNTER — TELEPHONE (OUTPATIENT)
Dept: SCHEDULING | Facility: CLINIC | Age: 82
End: 2022-07-21
Payer: COMMERCIAL

## 2022-07-21 NOTE — TELEPHONE ENCOUNTER
Cardiac Clearance     Name of caller: Chica Hurd     Relationship to patient: self     Name of patient: Chica Vannessa    Name of physician: Christal Olvera MD    Date of Surgery: 8/3    Type of Surgery: Kidney surgery Phiscula     Name of surgeon: Dr. Heladio Ellis     Office contact number:        Office fax number: Pt will call back to give fax .

## 2022-07-28 ENCOUNTER — HOSPITAL ENCOUNTER (OUTPATIENT)
Dept: CARDIOLOGY | Facility: CLINIC | Age: 82
Setting detail: NUCLEAR MEDICINE
Discharge: HOME | End: 2022-07-28
Attending: NURSE PRACTITIONER
Payer: COMMERCIAL

## 2022-07-28 ENCOUNTER — OFFICE VISIT (OUTPATIENT)
Dept: CARDIOLOGY | Facility: CLINIC | Age: 82
End: 2022-07-28
Payer: COMMERCIAL

## 2022-07-28 VITALS
HEART RATE: 110 BPM | OXYGEN SATURATION: 98 % | SYSTOLIC BLOOD PRESSURE: 110 MMHG | WEIGHT: 193 LBS | DIASTOLIC BLOOD PRESSURE: 66 MMHG | BODY MASS INDEX: 38.98 KG/M2

## 2022-07-28 DIAGNOSIS — I48.0 PAROXYSMAL ATRIAL FIBRILLATION (CMS/HCC): ICD-10-CM

## 2022-07-28 DIAGNOSIS — I10 PRIMARY HYPERTENSION: ICD-10-CM

## 2022-07-28 DIAGNOSIS — Z01.810 PRE-OPERATIVE CARDIOVASCULAR EXAMINATION: Primary | ICD-10-CM

## 2022-07-28 DIAGNOSIS — Z01.810 PREOPERATIVE CARDIOVASCULAR EXAMINATION: ICD-10-CM

## 2022-07-28 DIAGNOSIS — Z01.810 PRE-OPERATIVE CARDIOVASCULAR EXAMINATION: ICD-10-CM

## 2022-07-28 DIAGNOSIS — R06.09 DYSPNEA ON EXERTION: ICD-10-CM

## 2022-07-28 PROCEDURE — 3008F BODY MASS INDEX DOCD: CPT | Performed by: NURSE PRACTITIONER

## 2022-07-28 PROCEDURE — 93000 ELECTROCARDIOGRAM COMPLETE: CPT | Performed by: NURSE PRACTITIONER

## 2022-07-28 PROCEDURE — 99214 OFFICE O/P EST MOD 30 MIN: CPT | Performed by: NURSE PRACTITIONER

## 2022-07-28 ASSESSMENT — ENCOUNTER SYMPTOMS
NEAR-SYNCOPE: 0
WEIGHT GAIN: 1
SYNCOPE: 0
PND: 0
NERVOUS/ANXIOUS: 0
HEARTBURN: 0
HEMATURIA: 0
IRREGULAR HEARTBEAT: 0
CLAUDICATION: 0
BLURRED VISION: 0
DYSPNEA ON EXERTION: 1
ABDOMINAL PAIN: 0
WHEEZING: 0
ANOREXIA: 0
MYALGIAS: 0
SNORING: 0
DYSURIA: 0
PALPITATIONS: 0
HEADACHES: 0
NIGHT SWEATS: 0
COUGH: 0
DISTURBANCES IN COORDINATION: 0
ORTHOPNEA: 0
DIZZINESS: 0
WEAKNESS: 0
NAUSEA: 0
SHORTNESS OF BREATH: 0

## 2022-07-28 NOTE — ASSESSMENT & PLAN NOTE
Chica is in atrial fibrillation in the office today and is asymptomatic.  The onset is unclear.  She complains of LADD but this predates the atrial fibrillation. Her last hospital admission was in January with COVID and she appeared to be in sinus rhythm at the time.  Her heart rate is elevated but she tells me she has only been taking her carvedilol once a day because her blood pressure has been low.  I asked her to take 6.25 mg of carvedilol twice a day and she was given a monitor to wear for 24 hours to assess her rate and rhythm.  Chica has a [id number] of 4 - and she was given samples of Eliquis 2.5 mg to take twice a day.    She does have a history of rectal bleeding - the last episode in January which resolved without treatment and without a drop in her hemoglobin.  She did have a GI work-up in 9/2020 when she was admitted with a GI bleed.  She had a colonoscopy and had 5 polyps removed and an EGD revealed a non-bleeding gastric ulcer and gastritis.  She had a large duodenal polyp removed in November, 2020.  She will follow-up with Dr. Boogie.

## 2022-07-28 NOTE — ASSESSMENT & PLAN NOTE
Chica denies complaints of chest pain and had LADD which has been an on-going complaint.  This has not increased.  Her ECG today reveals atrial fibrillation which is a new finding.  Chica had an echocadiogram in 11/2021 which reveals mild concentric LVH and an LVEF of 65-70% with grossly normal wall motion.  She has, at most, mild to moderate AS and moderate MR.  This was discussed with Dr. Hughes and she is able to proceed with her AV fistula repair.  She was given samples of Eliquis today but was instructed to stop 48 hours before her surgery.

## 2022-07-28 NOTE — ASSESSMENT & PLAN NOTE
This has been an ongoing complaint and may be related to weight gain and deconditioning.  She had an echocardiogram in November, 2021 that revealed normal LV size with mild concentric LVH.  Her LVEF was 65 to 70% and wall motion appears grossly normal.  Her RV was mildly dilated with low normal function, moderate TR.  She had a moderately dilated right atrium and severely dilated left atrium.  The aortic valve is trileaflet with moderate diffuse calcification.  Dilated ANGELA was 1.4 cm².  Things were consistent with mild, at most mild to moderate aortic stenosis.  There was also moderate mitral regurgitation.  Compared to her previous echo in 2017, there has been an increase in her MR and her filling pressures were elevated.  Chica tells me she is taking lasix on her non-dialysis days.  Her BP today is on the lower side and limits the addition of medication.

## 2022-07-28 NOTE — ASSESSMENT & PLAN NOTE
Chica's blood pressure is on the low side today and she tells me she has been holding her evening coreg.  This may be related to the atrial fibrillation.  I asked her to decrease her coreg to 6.125 mg but take twice a day.

## 2022-07-28 NOTE — PROGRESS NOTES
"  HPI  Chica Hurd presents to the office today for a pre-operative cardiovascular examination prior to AV fistula repair.  Chica was last in the office in October, 2021 and since then was in the ER on 12/15/2021 with confusion felt to be related to valacyclovir she had just started for shingles.  A CT of the head revealed no acute intracranial abnormalities and she was released on a reduced dose.   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  She was sent to interventional radiology where she had a successful declot of the left forearm fistula.  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  Vascular surgery was consulted and there was a plan for AV fistula revision in the first week of January.  In the interim the patient had a right permacath placed for dialysis.   Chica went to skilled nursing from that hospital admission where she contacted COVID.  She tells me most of the residents in the nursing facility tested positive for COVID.  She had mild symptoms.  Chica was also sent back to [facility] with rectal bleeding.  There were no GI services available and she was going to be transferred to another facility but she had a negative bleeding scan and her hemoglobin was stable.  It was recommended she follow-up as an outpatient.  Today, she tells me she continues to get LADD but has been very inactive.  Prior to the pandemic, she was going to the [employer] 3-4 times a week and did water aerobics and participated in other senior exercise programs.   She currently does some exercises at home but does not feel comfortable returning to the GYM.  She denies complaints of chest pain, SOB or palpitations.  There is no orthopnea or PND, mild LE edema.  Her appetite is good \"for all the wrong foods\".       Medical History: has a past medical history of Anemia, Asthma, Cataracts, bilateral, COPD (chronic obstructive pulmonary disease) (CMS/[facility]), " Hypertension, Lipid disorder, Pre-diabetes, Renal disease, and Rheumatoid arthritis (CMS/[facility]).    Surgical History: has a past surgical history that includes Hysterectomy; Total hip arthroplasty (Bilateral); Tympanoplasty; AV fistula placement (Left, 2018); Cataract extraction w/  intraocular lens implant (2013); and Hemorrhoid surgery.    Social History:  Social History     Tobacco Use   • Smoking status: Former Smoker     Packs/day: 0.25     Quit date:      Years since quittin.5   • Smokeless tobacco: Never Used   Substance Use Topics   • Alcohol use: No   • Drug use: No       Family History: She indicated that her biological mother is . She indicated that her biological father is . She indicated that her biological sister is . She indicated that her biological brother is .           Allergies:Penicillins    Current Medications:  Current Outpatient Medications:   •  apixaban (ELIQUIS) 2.5 mg tablet, Take 1 tablet (2.5 mg total) by mouth 2 (two) times a day., Disp: 28 tablet, Rfl: 1  •  acetaminophen (TYLENOL EXTRA STRENGTH) 500 mg tablet, Take 500 mg by mouth every 12 (twelve) hours., Disp: , Rfl:   •  albuterol 2.5 mg /3 mL (0.083 %) nebulizer solution, Us as directed, Disp: , Rfl:   •  budesonide-formoterol (SYMBICORT) 160-4.5 mcg/actuation inhaler, Inhale 1 puff 2 (two) times a day.  , Disp: , Rfl:   •  carvedilol (COREG) 12.5 mg tablet, Take 6.25 mg by mouth 2 (two) times a day with meals. , Disp: , Rfl:   •  docusate sodium (COLACE) 100 mg capsule, Take 100 mg by mouth nightly., Disp: , Rfl:   •  escitalopram (LEXAPRO) 10 mg tablet, Take 5 mg by mouth daily.  , Disp: , Rfl:   •  fluticasone (FLONASE) 50 mcg/actuation nasal spray, Administer 1 spray into each nostril daily.  , Disp: , Rfl:   •  folic acid (FOLVITE) 1 mg tablet, Take 1 mg by mouth daily., Disp: , Rfl:   •  furosemide (LASIX) 80 mg tablet, Take 0.5 tablets (40 mg total) by mouth daily. On non-dialysis  days, Disp: , Rfl:   •  LORazepam (ATIVAN) 0.5 mg tablet, Take 0.5 mg by mouth 2 (two) times a day as needed.  , Disp: , Rfl:   •  meclizine (ANTIVERT) 12.5 mg tablet, Take 1-2 tablets by mouth every 6 hours as needed, Disp: , Rfl:   •  pantoprazole (PROTONIX) 40 mg EC tablet, Take 40 mg by mouth 2 times daily., Disp: , Rfl:   •  predniSONE (DELTASONE) 5 mg tablet, Take 5 mg by mouth daily.  , Disp: , Rfl:   •  sevelamer (RENVELA) 800 mg tablet, TAKE 1 TABLET BY ORAL ROUTE 3 TIMES EVERY DAY WITH FOOD, Disp: , Rfl: 3         Review of Systems   Constitutional: Positive for weight gain. Negative for malaise/fatigue and night sweats.   HENT: Negative for nosebleeds.    Eyes: Negative for blurred vision.   Cardiovascular: Positive for dyspnea on exertion. Negative for chest pain, claudication, cyanosis, irregular heartbeat, leg swelling, near-syncope, orthopnea, palpitations, paroxysmal nocturnal dyspnea and syncope.   Respiratory: Negative for cough, shortness of breath, snoring and wheezing.    Skin: Negative for rash.   Musculoskeletal: Positive for arthritis. Negative for myalgias.   Gastrointestinal: Negative for abdominal pain, anorexia, dysphagia, heartburn and nausea.   Genitourinary: Negative for dysuria, hematuria and nocturia.   Neurological: Negative for disturbances in coordination, dizziness, headaches and weakness.   Psychiatric/Behavioral: The patient is not nervous/anxious.    Allergic/Immunologic: Negative for HIV exposure.     Vitals:    07/28/22 1433   BP: 110/66   BP Location: Right upper arm   Patient Position: Sitting   Pulse: (!) 110   SpO2: 98%   Weight: 87.5 kg (193 lb)     Physical Exam  Constitutional:       General: She is not in acute distress.     Appearance: Normal appearance. She is not ill-appearing or diaphoretic.   HENT:      Head: Normocephalic and atraumatic.   Eyes:      Conjunctiva/sclera: Conjunctivae normal.   Cardiovascular:      Rate and Rhythm: Tachycardia present. Rhythm  irregular.      Heart sounds: Murmur (II/VI SM R-L SB) heard.      Comments: Left forearm AV fistula  Abdominal:      Palpations: Abdomen is soft.   Musculoskeletal:         General: Normal range of motion.      Cervical back: Normal range of motion and neck supple.      Right lower leg: Edema (trace) present.      Left lower leg: Edema (trace) present.   Skin:     General: Skin is warm and dry.   Neurological:      General: No focal deficit present.      Mental Status: She is alert and oriented to person, place, and time.   Psychiatric:         Mood and Affect: Mood normal.         Behavior: Behavior normal.         Thought Content: Thought content normal.         Judgment: Judgment normal.                ECG:  Atrial Fibrillation, rate 117, non-specific ST-T wave abnormalities.    Paroxysmal atrial fibrillation (CMS/HCC)  Chica is in atrial fibrillation in the office today and is asymptomatic.  The onset is unclear.  She complains of LADD but this predates the atrial fibrillation. Her last hospital admission was in January with COVID and she appeared to be in sinus rhythm at the time.  Her heart rate is elevated but she tells me she has only been taking her carvedilol once a day because her blood pressure has been low.  I asked her to take 6.25 mg of carvedilol twice a day and she was given a monitor to wear for 24 hours to assess her rate and rhythm.  Chica has a [id number] of 4 - and she was given samples of Eliquis 2.5 mg to take twice a day.    She does have a history of rectal bleeding - the last episode in January which resolved without treatment and without a drop in her hemoglobin.  She did have a GI work-up in 9/2020 when she was admitted with a GI bleed.  She had a colonoscopy and had 5 polyps removed and an EGD revealed a non-bleeding gastric ulcer and gastritis.  She had a large duodenal polyp removed in November, 2020.  She will follow-up with Dr. Boogie.    Hypertension  Chica's blood pressure is  on the low side today and she tells me she has been holding her evening coreg.  This may be related to the atrial fibrillation.  I asked her to decrease her coreg to 6.125 mg but take twice a day.    Dyspnea on exertion  This has been an ongoing complaint and may be related to weight gain and deconditioning.  She had an echocardiogram in November, 2021 that revealed normal LV size with mild concentric LVH.  Her LVEF was 65 to 70% and wall motion appears grossly normal.  Her RV was mildly dilated with low normal function, moderate TR.  She had a moderately dilated right atrium and severely dilated left atrium.  The aortic valve is trileaflet with moderate diffuse calcification.  Dilated AGNELA was 1.4 cm².  Things were consistent with mild, at most mild to moderate aortic stenosis.  There was also moderate mitral regurgitation.  Compared to her previous echo in 2017, there has been an increase in her MR and her filling pressures were elevated.  Chica tells me she is taking lasix on her non-dialysis days.  Her BP today is on the lower side and limits the addition of medication.    Preoperative cardiovascular examination  Chica denies complaints of chest pain and had LADD which has been an on-going complaint.  This has not increased.  Her ECG today reveals atrial fibrillation which is a new finding.  Chica had an echocadiogram in 11/2021 which reveals mild concentric LVH and an LVEF of 65-70% with grossly normal wall motion.  She has, at most, mild to moderate AS and moderate MR.  This was discussed with Dr. Hughes and she is able to proceed with her AV fistula repair.  She was given samples of Eliquis today but was instructed to stop 48 hours before her surgery.           Chica will follow-up in the office with Dr. Boogie and Dr. Hughes.  She is aware to contact us in the interim with any problems.    I spent 40 minutes on this date of service performing the following activities: obtaining history, performing  examination, entering orders, documenting, obtaining / reviewing records, providing counseling and education and communicating results.    LETY Botello   7/28/2022  5:37 PM

## 2022-08-02 ENCOUNTER — TELEPHONE (OUTPATIENT)
Dept: CARDIOLOGY | Facility: CLINIC | Age: 82
End: 2022-08-02
Payer: COMMERCIAL

## 2022-08-02 PROCEDURE — 93224 XTRNL ECG REC UP TO 48 HRS: CPT | Performed by: INTERNAL MEDICINE

## 2022-09-20 ENCOUNTER — OFFICE VISIT (OUTPATIENT)
Dept: CARDIOLOGY | Facility: CLINIC | Age: 82
End: 2022-09-20
Payer: COMMERCIAL

## 2022-09-20 VITALS
WEIGHT: 194 LBS | SYSTOLIC BLOOD PRESSURE: 102 MMHG | DIASTOLIC BLOOD PRESSURE: 68 MMHG | HEART RATE: 115 BPM | HEIGHT: 59 IN | RESPIRATION RATE: 16 BRPM | BODY MASS INDEX: 39.11 KG/M2

## 2022-09-20 DIAGNOSIS — N18.6 ESRD ON HEMODIALYSIS (CMS/HCC): ICD-10-CM

## 2022-09-20 DIAGNOSIS — I10 PRIMARY HYPERTENSION: Primary | ICD-10-CM

## 2022-09-20 DIAGNOSIS — I95.0 IDIOPATHIC HYPOTENSION: ICD-10-CM

## 2022-09-20 DIAGNOSIS — Z99.2 ESRD ON HEMODIALYSIS (CMS/HCC): ICD-10-CM

## 2022-09-20 DIAGNOSIS — I48.19 PERSISTENT ATRIAL FIBRILLATION (CMS/HCC): ICD-10-CM

## 2022-09-20 PROCEDURE — 3008F BODY MASS INDEX DOCD: CPT | Performed by: INTERNAL MEDICINE

## 2022-09-20 PROCEDURE — 93000 ELECTROCARDIOGRAM COMPLETE: CPT | Performed by: INTERNAL MEDICINE

## 2022-09-20 PROCEDURE — 99205 OFFICE O/P NEW HI 60 MIN: CPT | Performed by: INTERNAL MEDICINE

## 2022-09-20 RX ORDER — METOPROLOL SUCCINATE 50 MG/1
50 TABLET, EXTENDED RELEASE ORAL DAILY
Qty: 90 TABLET | Refills: 3 | Status: SHIPPED | OUTPATIENT
Start: 2022-09-20 | End: 2022-10-11 | Stop reason: SDUPTHER

## 2022-09-20 RX ORDER — WARFARIN 2 MG/1
2 TABLET ORAL DAILY
Qty: 60 TABLET | Refills: 0 | Status: SHIPPED | OUTPATIENT
Start: 2022-09-20 | End: 2022-10-11

## 2022-09-20 ASSESSMENT — ENCOUNTER SYMPTOMS
LIGHT-HEADEDNESS: 0
HEMATOCHEZIA: 0
DIZZINESS: 0
BACK PAIN: 1
SHORTNESS OF BREATH: 0
HEMATEMESIS: 0
ORTHOPNEA: 0
NEAR-SYNCOPE: 0
SYNCOPE: 0
DYSPNEA ON EXERTION: 0
FALLS: 0
WEIGHT LOSS: 0
CHANGE IN BOWEL HABIT: 0
STIFFNESS: 1
PALPITATIONS: 0
COUGH: 0

## 2022-09-20 NOTE — PROGRESS NOTES
Electrophysiology Office Note  Eliazar Boogie MD [facility]  [phone] (P)  [phone] (F)   Chica Hurd  YOB: 1940  Date of visit: 9/20/2022        Chica Hurd is a 82 y.o. female who is referred to see me today for evaluation and management of persistent atrial fibrillation.    Patient carries history of hypertension, COPD, end-stage renal disease on dialysis.  Echocardiogram in 2019 showed preserved LV function but severe dilated left atria.  She was known to have frequent atrial ectopies.  She was seen in cardiology office in July 2022 for routine follow-up and was noted to be in atrial fibrillation with rapid ventricular response.  She was asymptomatic.  Carvedilol was increased.  She was started on apixaban 2.5 mg twice daily.  She carries history of GI bleeding in 2020 and was noted to have colonic polyps and nonbleeding gastric ulcer and gastritis.  She has chronic anemia on iron supplement.  On apixaban, patient reports that her dark stool remains unchanged.  Per reports, anemia remains unchanged.    She reports occasional palpitation.  She denies dizziness, syncope, presyncope.  She is able to walk up 1 flight of stairs with no significant change in exercise endurance.  She denies PND or orthopnea.  Her edema has been stable.  She denies neurological symptoms.  She checks blood pressure every morning and will hold carvedilol when she notes systolic blood pressure around 100.  She had not been taking carvedilol in the past few days.        Past medical history, past surgical history, social history and family history are reviewed.  Pertinent changes are updated in the electronic medical record.    Past Medical History:  End-stage renal disease on hemodialysis  Rectal bleeding  COPD  Hypertension  Rheumatoid arthritis  Paroxysmal atrial fibrillation  COVID infection January 2022    Past Surgical History:  AV fistula revisions  Hemorrhoid surgery  Bilateral total hip  replacement  Hysterectomy    Social History:  Patient lives alone.  She still drives.  Her family lives in [place].  She has a cousin lives close by. She smoked in distant past and stopped in 1980's. She does not drink alcohol    Family History:  Father hd MI at age 57  Mother had MI at age 47      Allergies:  Penicillins      Current Outpatient Medications:     acetaminophen (TYLENOL) 500 mg tablet, Take 500 mg by mouth every 12 (twelve) hours., Disp: , Rfl:     albuterol 2.5 mg /3 mL (0.083 %) nebulizer solution, Us as directed, Disp: , Rfl:     budesonide-formoteroL (SYMBICORT) 160-4.5 mcg/actuation inhaler, Inhale 1 puff 2 (two) times a day.  , Disp: , Rfl:     docusate sodium (COLACE) 100 mg capsule, Take 100 mg by mouth nightly., Disp: , Rfl:     escitalopram (LEXAPRO) 10 mg tablet, Take 5 mg by mouth daily.  , Disp: , Rfl:     fluticasone (FLONASE) 50 mcg/actuation nasal spray, Administer 1 spray into each nostril daily.  , Disp: , Rfl:     folic acid (FOLVITE) 1 mg tablet, Take 1 mg by mouth daily., Disp: , Rfl:     LORazepam (ATIVAN) 0.5 mg tablet, Take 0.5 mg by mouth 2 (two) times a day as needed.  , Disp: , Rfl:     meclizine (ANTIVERT) 12.5 mg tablet, Take 1-2 tablets by mouth every 6 hours as needed, Disp: , Rfl:     metoprolol succinate XL (TOPROL-XL) 50 mg 24 hr tablet, Take 1 tablet (50 mg total) by mouth daily., Disp: 90 tablet, Rfl: 3    pantoprazole (PROTONIX) 40 mg EC tablet, Take 40 mg by mouth 2 times daily., Disp: , Rfl:     predniSONE (DELTASONE) 5 mg tablet, Take 5 mg by mouth daily.  , Disp: , Rfl:     sevelamer (RENVELA) 800 mg tablet, TAKE 1 TABLET BY ORAL ROUTE 3 TIMES EVERY DAY WITH FOOD, Disp: , Rfl: 3    warfarin (COUMADIN) 2 mg tablet, Take 1 tablet (2 mg total) by mouth once daily., Disp: 60 tablet, Rfl: 0    Review of Systems   Constitutional: Negative for weight loss.   Cardiovascular: Positive for leg swelling. Negative for chest pain, dyspnea on exertion, near-syncope,  "orthopnea, palpitations and syncope.   Respiratory: Negative for cough and shortness of breath.    Hematologic/Lymphatic: Negative for bleeding problem.   Musculoskeletal: Positive for arthritis, back pain, joint pain and stiffness. Negative for falls.   Gastrointestinal: Positive for hemorrhoids. Negative for change in bowel habit, hematemesis and hematochezia.   Neurological: Negative for dizziness and light-headedness.     Objective   Vitals:    09/20/22 0954   BP: 102/68   BP Location: Left upper arm   Patient Position: Sitting   Pulse: (!) 115   Resp: 16   Weight: 88 kg (194 lb)   Height: 1.499 m (4' 11\")     Physical Exam  Constitutional:       Appearance: She is well-developed.   HENT:      Head: Normocephalic and atraumatic.      Right Ear: External ear normal.      Left Ear: External ear normal.      Nose: Nose normal.   Eyes:      Conjunctiva/sclera: Conjunctivae normal.   Neck:      Thyroid: No thyroid mass.      Vascular: No carotid bruit or JVD.      Trachea: Trachea normal.   Cardiovascular:      Rate and Rhythm: Tachycardia present. Rhythm irregular.      Pulses:           Carotid pulses are 2+ on the right side and 2+ on the left side.     Heart sounds: Murmur heard.   Pulmonary:      Effort: Pulmonary effort is normal.      Breath sounds: Normal breath sounds.   Abdominal:      General: There is no distension.      Palpations: Abdomen is soft. There is no mass.   Musculoskeletal:      Cervical back: Normal range of motion and neck supple.      Comments: 1+ edema bilaterally;  AV fistula at left forearm, + thrill.    Skin:     General: Skin is warm and dry.   Neurological:      Mental Status: She is alert and oriented to person, place, and time.   Psychiatric:         Behavior: Behavior normal. Behavior is cooperative.        ECG: Atrial fibrillation with rapid ventricular response.  Poor wave progression.  Nonspecific ST changes.    Lab:(Personally reviewed)  8/1/2022  Sodium 134, potassium 4.8, BUN " 41, creatinine 7.2.  AST 23, ALT 25.  Hemoglobin 8.7.    Echocardiogram:   11/4/2021  · Normal LV size with mild concentric LVH.  LVEF 65-70%.  Wall motion appears grossly normal.  No LVOT gradient.  · Mildly dilated RV with low normal function.  Moderate TR with estimated PASP 50-55 mmHg.  Moderately dilated right atrium.  · Severely dilated left atrium.  Grade to inflow pattern consistent with elevated filling pressures.  · The aortic valve is trileaflet with moderate diffuse calcification.  Peak AV velocity 2.87 m/s with mean gradient 16 mmHg.  Calculated ANGELA 1.4 cm².  ANGELA by planimetry 1.4 cm².  Dimensionless index greater than 0.5.  Findings are consistent with mild, at most mild to moderate aortic stenosis.  Trace aortic insufficiency.  · The aorta appears sclerotic with calcifications near the sinuses of Valsalva.  Maximum ascending aortic diameter 3.9 cm with mild ectasia.  · The mitral valve leaflets are thickened.  The posterior leaflet is moderately restricted.  There is moderate regurgitation directed somewhat posteriorly.  · Dilated IVC with greater than 50% respiratory collapse.  · Trivial pericardial effusion.  · Compared to echocardiogram 2017 at outside institution, there has been increase in mitral regurgitation and pulmonary pressures.  Filling pressures are now elevated.    Holter:  7/28/2022  1. The patient was monitored for 24 hours.  2. The predominant rhythm was  atrial fibrillation (100% of recording duration).  3. The average heart rate was 100 bpm.  4. The minimum heart rate was 77 bpm.  5. The maximum heart rate was 143 bpm.  6. There were 168 premature ventricular beats. There were 5 ventricular couplets.  7. Patient did not log any symptoms.     Assessment   Persistent atrial fibrillation (CMS/HCC)  Patient was diagnosed of atrial fibrillation in July 2022.  She was asymptomatic.  True duration of arrhythmia is unknown.  Echocardiogram in 2021 showed severely dilated left atria.   Restoring and maintaining sinus rhythm could be challenging.  Heart rate continues to be elevated.  She had held carvedilol in the past few days secondary to systolic blood pressure running 100s.  She was asymptomatic.  She has been maintained on apixaban 2.5 mg twice daily.    I discussed with the patient importance of adequate anticoagulation to prevent stroke.  Data on using Apixaban in patients with end-stage renal disease is limited.  The recommended dosage would be 5 mg twice daily.  Especially with history of GI bleeding, I would like to change apixaban into warfarin for better control of anticoagulation status.  I would like to start the patient on warfarin 2 mg daily.  She is to discontinue apixaban 2 days afterward.  We will check INR level with target INR 2-3.  We discussed diet that may affect warfarin level.  Patient expressed clear understanding.    Heart rate continues to be elevated.  It is difficult to uptitrate beta-blocker with borderline hypotension.  I would like to stop carvedilol.  I would like to start the patient's on metoprolol 25 mg twice daily.  She is to hold metoprolol only if systolic blood pressure is less than 90.  She will be seen in cardiology office in 2 weeks.  We will increase metoprolol to 50 mg twice daily if blood pressure allows.    Given she is asymptomatic, we will aim for rate control instead of rhythm control.  Once INR has been therapeutic for 4 weeks, if heart rate continues to be elevated, to consider starting amiodarone for rate control.    All of the questions were answered to satisfaction.    Idiopathic hypotension  Patient's blood pressure has been running low since on dialysis.  She has been holding carvedilol.  We need to resume beta-blocker for rate control.  She is making minimal urine now.  I advised the patient to stop furosemide.  We will stop carvedilol but to start low-dose metoprolol.  Hopefully she will have more blood pressure room for beta-blocker.   To consider starting midodrine if blood pressure continues to be low.       I spent 62 minutes on this date of service performing the following activities: obtaining history, performing examination, entering orders, documenting, obtaining / reviewing records, providing counseling and education, independently reviewing study/studies, communicating results and coordinating care.   Care everywhere utilized where appropriate.     It is my pleasure to be involved in this patient's care.  Please do not hesitate to call if I could be of any assistance.     Renetta oBogie MD, MD, FACC

## 2022-09-20 NOTE — LETTER
September 20, 2022     Christal Olvera MD  [address] [facility] [address]    Patient: Chica Hurd  YOB: 1940  Date of Visit: 9/20/2022      Dear Dr. Saul Olvera:    Thank you for referring Chica Hurd to me for evaluation. Below are my notes for this consultation.    If you have questions, please do not hesitate to call me. I look forward to following your patient along with you.         Sincerely,        Eliazar. MD Keagan        CC: MD Sterling Cowart DO Wong, Kar-Lai, MD  9/20/2022 11:59 AM  Signed     Electrophysiology Office Note  Eliazar Boogie MD [facility]  [phone] (P)  [phone] (F)   Chica Hurd  YOB: 1940  Date of visit: 9/20/2022        Chica Hurd is a 82 y.o. female who is referred to see me today for evaluation and management of persistent atrial fibrillation.    Patient carries history of hypertension, COPD, end-stage renal disease on dialysis.  Echocardiogram in 2019 showed preserved LV function but severe dilated left atria.  She was known to have frequent atrial ectopies.  She was seen in cardiology office in July 2022 for routine follow-up and was noted to be in atrial fibrillation with rapid ventricular response.  She was asymptomatic.  Carvedilol was increased.  She was started on apixaban 2.5 mg twice daily.  She carries history of GI bleeding in 2020 and was noted to have colonic polyps and nonbleeding gastric ulcer and gastritis.  She has chronic anemia on iron supplement.  On apixaban, patient reports that her dark stool remains unchanged.  Per reports, anemia remains unchanged.    She reports occasional palpitation.  She denies dizziness, syncope, presyncope.  She is able to walk up 1 flight of stairs with no significant change in exercise endurance.  She denies PND or orthopnea.  Her edema has been stable.  She denies neurological symptoms.  She checks blood pressure every morning and will hold carvedilol when  she notes systolic blood pressure around 100.  She had not been taking carvedilol in the past few days.        Past medical history, past surgical history, social history and family history are reviewed.  Pertinent changes are updated in the electronic medical record.    Past Medical History:  End-stage renal disease on hemodialysis  Rectal bleeding  COPD  Hypertension  Rheumatoid arthritis  Paroxysmal atrial fibrillation  COVID infection January 2022    Past Surgical History:  AV fistula revisions  Hemorrhoid surgery  Bilateral total hip replacement  Hysterectomy    Social History:  Patient lives alone.  She still drives.  Her family lives in [place].  She has a cousin lives close by. She smoked in distant past and stopped in 1980's. She does not drink alcohol    Family History:  Father hd MI at age 57  Mother had MI at age 47      Allergies:  Penicillins      Current Outpatient Medications:     acetaminophen (TYLENOL) 500 mg tablet, Take 500 mg by mouth every 12 (twelve) hours., Disp: , Rfl:     albuterol 2.5 mg /3 mL (0.083 %) nebulizer solution, Us as directed, Disp: , Rfl:     budesonide-formoteroL (SYMBICORT) 160-4.5 mcg/actuation inhaler, Inhale 1 puff 2 (two) times a day.  , Disp: , Rfl:     docusate sodium (COLACE) 100 mg capsule, Take 100 mg by mouth nightly., Disp: , Rfl:     escitalopram (LEXAPRO) 10 mg tablet, Take 5 mg by mouth daily.  , Disp: , Rfl:     fluticasone (FLONASE) 50 mcg/actuation nasal spray, Administer 1 spray into each nostril daily.  , Disp: , Rfl:     folic acid (FOLVITE) 1 mg tablet, Take 1 mg by mouth daily., Disp: , Rfl:     LORazepam (ATIVAN) 0.5 mg tablet, Take 0.5 mg by mouth 2 (two) times a day as needed.  , Disp: , Rfl:     meclizine (ANTIVERT) 12.5 mg tablet, Take 1-2 tablets by mouth every 6 hours as needed, Disp: , Rfl:     metoprolol succinate XL (TOPROL-XL) 50 mg 24 hr tablet, Take 1 tablet (50 mg total) by mouth daily., Disp: 90 tablet, Rfl: 3    pantoprazole  "(PROTONIX) 40 mg EC tablet, Take 40 mg by mouth 2 times daily., Disp: , Rfl:     predniSONE (DELTASONE) 5 mg tablet, Take 5 mg by mouth daily.  , Disp: , Rfl:     sevelamer (RENVELA) 800 mg tablet, TAKE 1 TABLET BY ORAL ROUTE 3 TIMES EVERY DAY WITH FOOD, Disp: , Rfl: 3    warfarin (COUMADIN) 2 mg tablet, Take 1 tablet (2 mg total) by mouth once daily., Disp: 60 tablet, Rfl: 0    Review of Systems   Constitutional: Negative for weight loss.   Cardiovascular: Positive for leg swelling. Negative for chest pain, dyspnea on exertion, near-syncope, orthopnea, palpitations and syncope.   Respiratory: Negative for cough and shortness of breath.    Hematologic/Lymphatic: Negative for bleeding problem.   Musculoskeletal: Positive for arthritis, back pain, joint pain and stiffness. Negative for falls.   Gastrointestinal: Positive for hemorrhoids. Negative for change in bowel habit, hematemesis and hematochezia.   Neurological: Negative for dizziness and light-headedness.     Objective   Vitals:    09/20/22 0954   BP: 102/68   BP Location: Left upper arm   Patient Position: Sitting   Pulse: (!) 115   Resp: 16   Weight: 88 kg (194 lb)   Height: 1.499 m (4' 11\")     Physical Exam  Constitutional:       Appearance: She is well-developed.   HENT:      Head: Normocephalic and atraumatic.      Right Ear: External ear normal.      Left Ear: External ear normal.      Nose: Nose normal.   Eyes:      Conjunctiva/sclera: Conjunctivae normal.   Neck:      Thyroid: No thyroid mass.      Vascular: No carotid bruit or JVD.      Trachea: Trachea normal.   Cardiovascular:      Rate and Rhythm: Tachycardia present. Rhythm irregular.      Pulses:           Carotid pulses are 2+ on the right side and 2+ on the left side.     Heart sounds: Murmur heard.   Pulmonary:      Effort: Pulmonary effort is normal.      Breath sounds: Normal breath sounds.   Abdominal:      General: There is no distension.      Palpations: Abdomen is soft. There is no " mass.   Musculoskeletal:      Cervical back: Normal range of motion and neck supple.      Comments: 1+ edema bilaterally;  AV fistula at left forearm, + thrill.    Skin:     General: Skin is warm and dry.   Neurological:      Mental Status: She is alert and oriented to person, place, and time.   Psychiatric:         Behavior: Behavior normal. Behavior is cooperative.        ECG: Atrial fibrillation with rapid ventricular response.  Poor wave progression.  Nonspecific ST changes.    Lab:(Personally reviewed)  8/1/2022  Sodium 134, potassium 4.8, BUN 41, creatinine 7.2.  AST 23, ALT 25.  Hemoglobin 8.7.    Echocardiogram:   11/4/2021  · Normal LV size with mild concentric LVH.  LVEF 65-70%.  Wall motion appears grossly normal.  No LVOT gradient.  · Mildly dilated RV with low normal function.  Moderate TR with estimated PASP 50-55 mmHg.  Moderately dilated right atrium.  · Severely dilated left atrium.  Grade to inflow pattern consistent with elevated filling pressures.  · The aortic valve is trileaflet with moderate diffuse calcification.  Peak AV velocity 2.87 m/s with mean gradient 16 mmHg.  Calculated ANGELA 1.4 cm².  ANGELA by planimetry 1.4 cm².  Dimensionless index greater than 0.5.  Findings are consistent with mild, at most mild to moderate aortic stenosis.  Trace aortic insufficiency.  · The aorta appears sclerotic with calcifications near the sinuses of Valsalva.  Maximum ascending aortic diameter 3.9 cm with mild ectasia.  · The mitral valve leaflets are thickened.  The posterior leaflet is moderately restricted.  There is moderate regurgitation directed somewhat posteriorly.  · Dilated IVC with greater than 50% respiratory collapse.  · Trivial pericardial effusion.  · Compared to echocardiogram 2017 at outside institution, there has been increase in mitral regurgitation and pulmonary pressures.  Filling pressures are now elevated.    Holter:  7/28/2022  1. The patient was monitored for 24 hours.  2. The  predominant rhythm was  atrial fibrillation (100% of recording duration).  3. The average heart rate was 100 bpm.  4. The minimum heart rate was 77 bpm.  5. The maximum heart rate was 143 bpm.  6. There were 168 premature ventricular beats. There were 5 ventricular couplets.  7. Patient did not log any symptoms.     Assessment   Persistent atrial fibrillation (CMS/HCC)  Patient was diagnosed of atrial fibrillation in July 2022.  She was asymptomatic.  True duration of arrhythmia is unknown.  Echocardiogram in 2021 showed severely dilated left atria.  Restoring and maintaining sinus rhythm could be challenging.  Heart rate continues to be elevated.  She had held carvedilol in the past few days secondary to systolic blood pressure running 100s.  She was asymptomatic.  She has been maintained on apixaban 2.5 mg twice daily.    I discussed with the patient importance of adequate anticoagulation to prevent stroke.  Data on using Apixaban in patients with end-stage renal disease is limited.  The recommended dosage would be 5 mg twice daily.  Especially with history of GI bleeding, I would like to change apixaban into warfarin for better control of anticoagulation status.  I would like to start the patient on warfarin 2 mg daily.  She is to discontinue apixaban 2 days afterward.  We will check INR level with target INR 2-3.  We discussed diet that may affect warfarin level.  Patient expressed clear understanding.    Heart rate continues to be elevated.  It is difficult to uptitrate beta-blocker with borderline hypotension.  I would like to stop carvedilol.  I would like to start the patient's on metoprolol 25 mg twice daily.  She is to hold metoprolol only if systolic blood pressure is less than 90.  She will be seen in cardiology office in 2 weeks.  We will increase metoprolol to 50 mg twice daily if blood pressure allows.    Given she is asymptomatic, we will aim for rate control instead of rhythm control.  Once INR has  been therapeutic for 4 weeks, if heart rate continues to be elevated, to consider starting amiodarone for rate control.    All of the questions were answered to satisfaction.    Idiopathic hypotension  Patient's blood pressure has been running low since on dialysis.  She has been holding carvedilol.  We need to resume beta-blocker for rate control.  She is making minimal urine now.  I advised the patient to stop furosemide.  We will stop carvedilol but to start low-dose metoprolol.  Hopefully she will have more blood pressure room for beta-blocker.  To consider starting midodrine if blood pressure continues to be low.       I spent 62 minutes on this date of service performing the following activities: obtaining history, performing examination, entering orders, documenting, obtaining / reviewing records, providing counseling and education, independently reviewing study/studies, communicating results and coordinating care.   Care everywhere utilized where appropriate.     It is my pleasure to be involved in this patient's care.  Please do not hesitate to call if I could be of any assistance.     Renetta Boogie MD, MD, FACC

## 2022-09-20 NOTE — ASSESSMENT & PLAN NOTE
Patient's blood pressure has been running low since on dialysis.  She has been holding carvedilol.  We need to resume beta-blocker for rate control.  She is making minimal urine now.  I advised the patient to stop furosemide.  We will stop carvedilol but to start low-dose metoprolol.  Hopefully she will have more blood pressure room for beta-blocker.  To consider starting midodrine if blood pressure continues to be low.

## 2022-09-20 NOTE — PATIENT INSTRUCTIONS
1) We are switching your Apixaban into Warfarin.    .  Continue Apixaban until Thursday (last dose Thursday morning)  2) Start taking Warfarin at night. 2 pills every night  3) Will get blood test on Thursday to evaluate INR  4) Ronit will call you after blood test to tell you what to take.  Call if you don't hear from her Friday  5) Stop Lasix and Carvedilol  6) Start taking Metoprolol 1/2 pill twice daily; Hold only if your blood pressure is < 90

## 2022-09-26 ENCOUNTER — TELEPHONE (OUTPATIENT)
Dept: SCHEDULING | Facility: CLINIC | Age: 82
End: 2022-09-26

## 2022-09-26 NOTE — TELEPHONE ENCOUNTER
Pt of Dr Boogie w/ hx of PAF, ESRD w/ HD, HTN    MD Hager at [facility] [place] is requesting a return call in regards to recent medication adjustments during OV on 9/20/22 as he is voicing concerns r/t ankle edema and anti-coagulation     Please reach at [phone]

## 2022-09-26 NOTE — TELEPHONE ENCOUNTER
Called.  Dr Hager not as that location.  His staff will ask him to return call to me. Please interrupt me if he calls.

## 2022-09-26 NOTE — TELEPHONE ENCOUNTER
I discussed with Dr. Jasso.  Patient has chronic dark stool.  Repeat hemoglobin today 6.5.  I advised him to stop warfarin and apixaban.  Continue on low-dose metoprolol if blood pressure allows for rate control.  Patient was advised to go to emergency room but she refused.  She refused GI work-up in the past.  She will be seen again in dialysis center in 2 days.

## 2022-10-11 ENCOUNTER — OFFICE VISIT (OUTPATIENT)
Dept: CARDIOLOGY | Facility: CLINIC | Age: 82
End: 2022-10-11
Payer: COMMERCIAL

## 2022-10-11 VITALS
HEART RATE: 120 BPM | DIASTOLIC BLOOD PRESSURE: 66 MMHG | BODY MASS INDEX: 38.71 KG/M2 | WEIGHT: 192 LBS | SYSTOLIC BLOOD PRESSURE: 106 MMHG | HEIGHT: 59 IN | RESPIRATION RATE: 16 BRPM

## 2022-10-11 DIAGNOSIS — I25.10 CORONARY ARTERY DISEASE INVOLVING NATIVE CORONARY ARTERY OF NATIVE HEART WITHOUT ANGINA PECTORIS: ICD-10-CM

## 2022-10-11 DIAGNOSIS — R94.31 ABNORMAL EKG: ICD-10-CM

## 2022-10-11 DIAGNOSIS — K92.1 GASTROINTESTINAL HEMORRHAGE WITH MELENA: Chronic | ICD-10-CM

## 2022-10-11 DIAGNOSIS — R06.09 DYSPNEA ON EXERTION: ICD-10-CM

## 2022-10-11 DIAGNOSIS — Z99.2 ESRD ON HEMODIALYSIS (CMS/HCC): ICD-10-CM

## 2022-10-11 DIAGNOSIS — I70.0 AORTO-ILIAC ATHEROSCLEROSIS (CMS/HCC)  (CMS/HCC): ICD-10-CM

## 2022-10-11 DIAGNOSIS — I70.8 AORTO-ILIAC ATHEROSCLEROSIS (CMS/HCC)  (CMS/HCC): ICD-10-CM

## 2022-10-11 DIAGNOSIS — N18.6 ESRD ON HEMODIALYSIS (CMS/HCC): ICD-10-CM

## 2022-10-11 DIAGNOSIS — I35.9 AORTIC VALVE DISEASE: ICD-10-CM

## 2022-10-11 DIAGNOSIS — I48.19 PERSISTENT ATRIAL FIBRILLATION (CMS/HCC): Primary | ICD-10-CM

## 2022-10-11 DIAGNOSIS — I95.0 IDIOPATHIC HYPOTENSION: ICD-10-CM

## 2022-10-11 PROCEDURE — 93000 ELECTROCARDIOGRAM COMPLETE: CPT | Performed by: INTERNAL MEDICINE

## 2022-10-11 PROCEDURE — 99215 OFFICE O/P EST HI 40 MIN: CPT | Performed by: INTERNAL MEDICINE

## 2022-10-11 PROCEDURE — 3008F BODY MASS INDEX DOCD: CPT | Performed by: INTERNAL MEDICINE

## 2022-10-11 RX ORDER — METOPROLOL SUCCINATE 50 MG/1
50 TABLET, EXTENDED RELEASE ORAL 2 TIMES DAILY
Qty: 180 TABLET | Refills: 3
Start: 2022-10-11 | End: 2022-10-11 | Stop reason: SDUPTHER

## 2022-10-11 RX ORDER — SENNOSIDES 8.6 MG/1
1 TABLET ORAL DAILY
COMMUNITY

## 2022-10-11 RX ORDER — ADHESIVE BANDAGE
30 BANDAGE TOPICAL DAILY PRN
COMMUNITY
End: 2023-02-14

## 2022-10-11 RX ORDER — METOPROLOL SUCCINATE 50 MG/1
50 TABLET, EXTENDED RELEASE ORAL 2 TIMES DAILY
Qty: 180 TABLET | Refills: 3 | Status: SHIPPED | OUTPATIENT
Start: 2022-10-11 | End: 2022-11-15

## 2022-10-11 NOTE — LETTER
October 16, 2022     Alex Monique MD  [address] [facility] [address]    Patient: Chica Hurd  YOB: 1940  Date of Visit: 10/11/2022      Dear Dr. Monique:    Thank you for referring Chica Hurd to me for evaluation. Below are my notes for this consultation.    If you have questions, please do not hesitate to call me. I look forward to following your patient along with you.         Sincerely,        Christal Olvera MD        CC: DO Eliazar Goel MD Douglas B Esberg, MD O'Malley Tysko, Erin, MD  10/16/2022 11:43 AM  Signed       Cardiology Office Visit     Patient ID: Chica Hurd 82 y.o. female 1940  PCP: Alex Monique MD   History Present Illness     Chica Hurd returns to the office today seeing me for the first time since January 2019.  In the interim, she has seen our nurse practitioner and also electrophysiology regarding preoperative clearance and atrial fibrillation.  Please allow me to review her rather complicated history.    The patient has history of hypertension, COPD, ESRD on HD, AV fistula which has been malfunctioning recently and required declotting, previous COVID infection and GI bleeding.  She had a transient episode of rectal bleeding within the last year, but this was self-limiting and did not require any additional therapy.  She has been chronically anemic and is on an iron supplement.    While being seen in our office in July for preoperative risk assessment for AV fistula repair, she was found to be in atrial fibrillation with rapid ventricular response.  She had no chest pain and did not perceive any palpitations, but does admit to dyspnea on exertion which is chronic issue.  There was no syncope or presyncope.  She had noted that her blood pressure had been relatively low and there were times that she was holding her carvedilol.    The plan at her last electrophysiology visit in September was to transition her to warfarin with  close follow-up in anticipation of cardioversion.  A Holter monitor had demonstrated that she was in persistent atrial fibrillation with rapid rates intermittently, average heart rate being about 100 bpm.  She has significant atrial dilatation and maintaining sinus rhythm may be challenging and will require use of an antiarrhythmic.  She was started on metoprolol to try to improve her heart rate control.    Approximately 1 week after her electrophysiology visit, she suffered another GI bleed characterized by melanotic stools and hemoglobin of approximately 6 g/dL.  She was admitted to an outside hospital and had Hemoccult positive stool.  Anticoagulation was discontinued.  GI performed an EGD showing a small gastric antrum diverticulum and prior peptic ulcer disease, but there was no active bleeding.  She subsequently underwent placement of a temporary dialysis catheter and declotting of her fistula.    Today in the office she says that she is a bit tired.  Her stool remains dark but she denies any hematemesis or hematochezia.  She does not have any chest pain but is dyspneic with exertion.  EKG demonstrates atrial fibrillation and her heart rates are about 100 bpm.    Allergies/Medications   Allergies:Penicillins    Current Outpatient Medications:    acetaminophen, Take 500 mg by mouth every 12 (twelve) hours.    albuterol, Us as directed    budesonide-formoteroL, Inhale 1 puff 2 (two) times a day.      escitalopram, Take 5 mg by mouth daily.      fluticasone propionate, Administer 1 spray into each nostril daily.      folic acid, Take 1 mg by mouth daily.    LORazepam, Take 0.5 mg by mouth 2 (two) times a day as needed.      magnesium hydroxide, Take 30 mL by mouth daily as needed.    metoprolol succinate XL, Take 1 tablet (50 mg total) by mouth 2 (two) times a day.    pantoprazole, Take 40 mg by mouth 2 times daily.    predniSONE, Take 5 mg by mouth daily.      senna, Take 1 tablet by mouth daily.     "sevelamer, TAKE 1 TABLET BY ORAL ROUTE 3 TIMES EVERY DAY WITH FOOD   Physical Exam     Vitals:    10/11/22 1417 10/11/22 1444   BP: 106/62 106/66   BP Location: Left upper arm Right upper arm   Patient Position: Sitting Sitting   Pulse: (!) 128 (!) 120   Resp: 16    Weight: 87.1 kg (192 lb)    Height: 1.499 m (4' 11\")      BP Readings from Last 3 Encounters:   10/11/22 106/66   09/20/22 102/68   07/28/22 110/66     Wt Readings from Last 3 Encounters:   10/11/22 87.1 kg (192 lb)   09/20/22 88 kg (194 lb)   07/28/22 87.5 kg (193 lb)      Physical Exam:  Constitutional: Obese -American female.  No apparent distress.   Eyes: No icterus  ENT:  Mucosa moist  Neck: Supple, no JVD or hepatojugular reflux.  No bruits.  Cardiac: Normal S1 and S2, irregularly irregular rhythm at approximately 100 bpm, no S3.  No rub.  No ectopy.  There is a 2/6 systolic murmur at the right and left sternal borders.  Lungs: Clear to auscultation bilaterally.  No wheezing.  Abdomen: Soft, nontender, positive normoactive bowel sounds.  No bruit.  Extremities: No clubbing or cyanosis.  No calf tenderness.  No edema.  Distal pulses are intact.  Fistula with thrill.  Skin: Warm and dry.  No jaundice.  Musculoskeletal: No joint swelling or erythema.  Neurologic: Awake, alert, oriented.  Moving all extremities.  Psychiatric: No agitation.    Labs/Imaging/Procedures   Labs  10/5/2022: Sodium 135, potassium 4.7, glucose 77, BUN 25 and creatinine 6.6.  Hemoglobin 7.9, platelets 255,000.  Iron 24, iron saturation 10%.  Ferritin 521    Imaging  Echo 10/4/2022: Performed at [facility]-normal LV size with mild concentric LVH.  LVEF 65-70%.  Atrial fibrillation throughout study.  Mildly dilated RV with preserved function.  Mildly dilated left atrium.  Moderate MAC.  Mild to moderate MR.  No mitral stenosis.  Calcified aortic valve with mild to moderate aortic stenosis.  Mean AV gradient 15 mmHg.  Calculated ANGELA 1.5 cm².  Moderate to severe " TR.  RVSP 55 mmHg.  Trivial pericardial effusion.    Holter monitor 7/28/2022: Atrial fibrillation for 100% of the recording.  Average  bpm, min HR 77 bpm and max  bpm.  Rare PVCs.    SEAN 6/25/2021: Performed at [facility].  Right SEAN 1.22.  Left SEAN 1.25.    Carotid ultrasound 2021: No hemodynamically significant stenosis.  Small amount of nonobstructing thrombus right internal jugular vein.    CTA chest 10/4/2020: No evidence of pulmonary embolism.  No evidence of aortic dissection.  No pleural effusion.  Enlarged heart with extensive coronary artery calcification.  Calcified aortic valve.  Heterogeneous enlarged thyroid.  Moderate aortoiliac calcification.    Echo 11/4/2021: Normal LV size with mild concentric LVH.  LVEF 65-70%.  Wall motion appears grossly normal.  No LVOT gradient.  Mildly dilated RV with low normal function.  Moderate TR and estimated PASP 50-55 mmHg.  Moderately dilated right atrium.  Severely dilated left atrium.  Grade II inflow pattern consistent with elevated filling pressures.  Trileaflet diffusely calcified aortic valve with mean gradient 16 mmHg.  Calculated ANGELA 1.4 cm².  Dimensionless index greater than 0.5.  Trace AI.  Maximum ascending aortic diameter 3.9 cm.  Thickened mitral valve leaflets with moderate MR directed posteriorly.  Dilated IVC.  Trivial pericardial effusion.    Echo 2018: Normal LV size and function with impaired relaxation.  Mean AV gradient 11 mmHg.  At most mild MR.  PASP 41 mmHg.    EKG  Assessment/Plan     1. Persistent atrial fibrillation (CMS/HCC)  Rates are not well controlled and I think we should attempt restoration of sinus rhythm.  Unfortunately, she has proven that she cannot be anticoagulated and has a high risk for recurrent GI bleeding.  Therefore I am not resuming systemic anticoagulation.  She has an elevated [id number] score of 4 and I explained that she should be anticoagulated to prevent stroke and TIA.  Given the above, I  am recommending that she have a consultation with electrophysiology regarding placement of a watchman device.  I explained this to her and she is amenable to the consultation.  We will arrange for this to be scheduled in our office shortly.  Her rates are not well controlled and I am increasing her metoprolol to 50 mg twice daily.  Hopefully her blood pressure will tolerate this since she is off carvedilol.  She will likely need an antiarrhythmic to help maintain sinus rhythm and in the past she has had some bradycardia when in sinus rhythm.  Ultimately she may need pacemaker placement to use an antiarrhythmic such as amiodarone to maintain sinus rhythm.  I discussed the plan with electrophysiology and they are in agreement.  - metoprolol succinate XL (TOPROL-XL) 50 mg 24 hr tablet; Take 1 tablet (50 mg total) by mouth 2 (two) times a day.  Dispense: 180 tablet; Refill: 3    2. Abnormal EKG  Nonspecific ST-T abnormality and rapid atrial fibrillation.    3. ESRD on hemodialysis (CMS/[facility])  Continue current therapy.    4. Idiopathic hypotension  I am unclear if her relative hypotension is from atrial fibrillation with rapid rates.  Off carvedilol and trying to titrate up metoprolol.    5. Dyspnea on exertion  Multifactorial dyspnea on exertion related to obesity, deconditioning, anemia, iron deficiency and rapid atrial fibrillation.  In the past we have attempted stress testing, but she is very difficult IV access and the study could not be completed.  A CT of the chest done during previous hospitalization showed extensive coronary calcification, so she has at least some degree of coronary artery disease.  Continue beta-blocker.  Off anticoagulation.    6. Aortic valve disease  Mild, most mild to moderate aortic stenosis.  No additional recommendations.    7. Gastrointestinal hemorrhage with melena  Hospital chart reviewed.  No active bleeding as per patient.  High risk for recurrent hemorrhage.    8. Aorto-iliac  atherosclerosis (CMS/HCC)  She is currently on no antiplatelet therapy or anticoagulation.    9. Coronary artery disease involving native coronary artery of native heart without angina pectoris  Continue beta-blocker.  At the present time she is not on statin therapy and there is no recent lipid profile available.  This will need to be addressed at future visits.    Return in about 4 months (around 2/11/2023) for follow-up, earlier if any change in symptoms.  We had a prolonged discussion about these complex clinical issues and went over the various important aspects to consider. All questions were answered.  I spent 44 minutes on the date of service performing the specified activities.  Consultation with electrophysiology performed.  Available medical records reviewed and updated including laboratory data, imaging studies, previous outpatient and inpatient records.  Counseling/education performed. Care everywhere utilized where appropriate.    Thank you for allowing me to participate in the care of this patient.  I hope this information is helpful.      Christal Olvera MD [facility], FASE  10/16/2022  11:34 AM  This document was generated utilizing voice recognition technology. Please excuse any typographical errors which may be present.

## 2022-10-11 NOTE — PATIENT INSTRUCTIONS
STAY OFF WARFARIN AND ELIQUIS (BLOOD THINNERS)    INCREASE TOPROL XL TO ONE IN MORNING AND ONE AT NIGHT

## 2022-10-16 ASSESSMENT — CHADS2 SCORE
HYPERTENSION: YES (+1 PT.)
AGE: 75+ (+2 PT.)
DIABETES: NO
CHADS2 SCORE: 4
PRIOR STROKE OR TIA OR THROMBOEMBOLISM: NO
VASCULAR DISEASE: NO
CHF: NO
SEX: FEMALE (+1PT.)

## 2022-10-16 NOTE — PROGRESS NOTES
Cardiology Office Visit     Patient ID: Chica Hurd 82 y.o. female 1940  PCP: Alex Monique MD   History Present Illness     Chica Hurd returns to the office today seeing me for the first time since January 2019.  In the interim, she has seen our nurse practitioner and also electrophysiology regarding preoperative clearance and atrial fibrillation.  Please allow me to review her rather complicated history.    The patient has history of hypertension, COPD, ESRD on HD, AV fistula which has been malfunctioning recently and required declotting, previous COVID infection and GI bleeding.  She had a transient episode of rectal bleeding within the last year, but this was self-limiting and did not require any additional therapy.  She has been chronically anemic and is on an iron supplement.    While being seen in our office in July for preoperative risk assessment for AV fistula repair, she was found to be in atrial fibrillation with rapid ventricular response.  She had no chest pain and did not perceive any palpitations, but does admit to dyspnea on exertion which is chronic issue.  There was no syncope or presyncope.  She had noted that her blood pressure had been relatively low and there were times that she was holding her carvedilol.    The plan at her last electrophysiology visit in September was to transition her to warfarin with close follow-up in anticipation of cardioversion.  A Holter monitor had demonstrated that she was in persistent atrial fibrillation with rapid rates intermittently, average heart rate being about 100 bpm.  She has significant atrial dilatation and maintaining sinus rhythm may be challenging and will require use of an antiarrhythmic.  She was started on metoprolol to try to improve her heart rate control.    Approximately 1 week after her electrophysiology visit, she suffered another GI bleed characterized by melanotic stools and hemoglobin of approximately 6 g/dL.  She was  "admitted to an outside hospital and had Hemoccult positive stool.  Anticoagulation was discontinued.  GI performed an EGD showing a small gastric antrum diverticulum and prior peptic ulcer disease, but there was no active bleeding.  She subsequently underwent placement of a temporary dialysis catheter and declotting of her fistula.    Today in the office she says that she is a bit tired.  Her stool remains dark but she denies any hematemesis or hematochezia.  She does not have any chest pain but is dyspneic with exertion.  EKG demonstrates atrial fibrillation and her heart rates are about 100 bpm.    Allergies/Medications   Allergies:Penicillins    Current Outpatient Medications:    acetaminophen, Take 500 mg by mouth every 12 (twelve) hours.    albuterol, Us as directed    budesonide-formoteroL, Inhale 1 puff 2 (two) times a day.      escitalopram, Take 5 mg by mouth daily.      fluticasone propionate, Administer 1 spray into each nostril daily.      folic acid, Take 1 mg by mouth daily.    LORazepam, Take 0.5 mg by mouth 2 (two) times a day as needed.      magnesium hydroxide, Take 30 mL by mouth daily as needed.    metoprolol succinate XL, Take 1 tablet (50 mg total) by mouth 2 (two) times a day.    pantoprazole, Take 40 mg by mouth 2 times daily.    predniSONE, Take 5 mg by mouth daily.      senna, Take 1 tablet by mouth daily.    sevelamer, TAKE 1 TABLET BY ORAL ROUTE 3 TIMES EVERY DAY WITH FOOD   Physical Exam     Vitals:    10/11/22 1417 10/11/22 1444   BP: 106/62 106/66   BP Location: Left upper arm Right upper arm   Patient Position: Sitting Sitting   Pulse: (!) 128 (!) 120   Resp: 16    Weight: 87.1 kg (192 lb)    Height: 1.499 m (4' 11\")      BP Readings from Last 3 Encounters:   10/11/22 106/66   09/20/22 102/68   07/28/22 110/66     Wt Readings from Last 3 Encounters:   10/11/22 87.1 kg (192 lb)   09/20/22 88 kg (194 lb)   07/28/22 87.5 kg (193 lb)      Physical Exam:  Constitutional: Obese " -American female.  No apparent distress.   Eyes: No icterus  ENT:  Mucosa moist  Neck: Supple, no JVD or hepatojugular reflux.  No bruits.  Cardiac: Normal S1 and S2, irregularly irregular rhythm at approximately 100 bpm, no S3.  No rub.  No ectopy.  There is a 2/6 systolic murmur at the right and left sternal borders.  Lungs: Clear to auscultation bilaterally.  No wheezing.  Abdomen: Soft, nontender, positive normoactive bowel sounds.  No bruit.  Extremities: No clubbing or cyanosis.  No calf tenderness.  No edema.  Distal pulses are intact.  Fistula with thrill.  Skin: Warm and dry.  No jaundice.  Musculoskeletal: No joint swelling or erythema.  Neurologic: Awake, alert, oriented.  Moving all extremities.  Psychiatric: No agitation.    Labs/Imaging/Procedures   Labs  10/5/2022: Sodium 135, potassium 4.7, glucose 77, BUN 25 and creatinine 6.6.  Hemoglobin 7.9, platelets 255,000.  Iron 24, iron saturation 10%.  Ferritin 521    Imaging  Echo 10/4/2022: Performed at [facility]-normal LV size with mild concentric LVH.  LVEF 65-70%.  Atrial fibrillation throughout study.  Mildly dilated RV with preserved function.  Mildly dilated left atrium.  Moderate MAC.  Mild to moderate MR.  No mitral stenosis.  Calcified aortic valve with mild to moderate aortic stenosis.  Mean AV gradient 15 mmHg.  Calculated ANGELA 1.5 cm².  Moderate to severe TR.  RVSP 55 mmHg.  Trivial pericardial effusion.    Holter monitor 7/28/2022: Atrial fibrillation for 100% of the recording.  Average  bpm, min HR 77 bpm and max  bpm.  Rare PVCs.    SEAN 6/25/2021: Performed at [facility].  Right SEAN 1.22.  Left SEAN 1.25.    Carotid ultrasound 2021: No hemodynamically significant stenosis.  Small amount of nonobstructing thrombus right internal jugular vein.    CTA chest 10/4/2020: No evidence of pulmonary embolism.  No evidence of aortic dissection.  No pleural effusion.  Enlarged heart with extensive coronary artery  calcification.  Calcified aortic valve.  Heterogeneous enlarged thyroid.  Moderate aortoiliac calcification.    Echo 11/4/2021: Normal LV size with mild concentric LVH.  LVEF 65-70%.  Wall motion appears grossly normal.  No LVOT gradient.  Mildly dilated RV with low normal function.  Moderate TR and estimated PASP 50-55 mmHg.  Moderately dilated right atrium.  Severely dilated left atrium.  Grade II inflow pattern consistent with elevated filling pressures.  Trileaflet diffusely calcified aortic valve with mean gradient 16 mmHg.  Calculated ANGELA 1.4 cm².  Dimensionless index greater than 0.5.  Trace AI.  Maximum ascending aortic diameter 3.9 cm.  Thickened mitral valve leaflets with moderate MR directed posteriorly.  Dilated IVC.  Trivial pericardial effusion.    Echo 2018: Normal LV size and function with impaired relaxation.  Mean AV gradient 11 mmHg.  At most mild MR.  PASP 41 mmHg.    EKG  Assessment/Plan     1. Persistent atrial fibrillation (CMS/HCC)  Rates are not well controlled and I think we should attempt restoration of sinus rhythm.  Unfortunately, she has proven that she cannot be anticoagulated and has a high risk for recurrent GI bleeding.  Therefore I am not resuming systemic anticoagulation.  She has an elevated TAU2PW3-WSTo score of 4 and I explained that she should be anticoagulated to prevent stroke and TIA.  Given the above, I am recommending that she have a consultation with electrophysiology regarding placement of a watchman device.  I explained this to her and she is amenable to the consultation.  We will arrange for this to be scheduled in our office shortly.  Her rates are not well controlled and I am increasing her metoprolol to 50 mg twice daily.  Hopefully her blood pressure will tolerate this since she is off carvedilol.  She will likely need an antiarrhythmic to help maintain sinus rhythm and in the past she has had some bradycardia when in sinus rhythm.  Ultimately she may need  pacemaker placement to use an antiarrhythmic such as amiodarone to maintain sinus rhythm.  I discussed the plan with electrophysiology and they are in agreement.  - metoprolol succinate XL (TOPROL-XL) 50 mg 24 hr tablet; Take 1 tablet (50 mg total) by mouth 2 (two) times a day.  Dispense: 180 tablet; Refill: 3    2. Abnormal EKG  Nonspecific ST-T abnormality and rapid atrial fibrillation.    3. ESRD on hemodialysis (CMS/HCC)  Continue current therapy.    4. Idiopathic hypotension  I am unclear if her relative hypotension is from atrial fibrillation with rapid rates.  Off carvedilol and trying to titrate up metoprolol.    5. Dyspnea on exertion  Multifactorial dyspnea on exertion related to obesity, deconditioning, anemia, iron deficiency and rapid atrial fibrillation.  In the past we have attempted stress testing, but she is very difficult IV access and the study could not be completed.  A CT of the chest done during previous hospitalization showed extensive coronary calcification, so she has at least some degree of coronary artery disease.  Continue beta-blocker.  Off anticoagulation.    6. Aortic valve disease  Mild, most mild to moderate aortic stenosis.  No additional recommendations.    7. Gastrointestinal hemorrhage with melena  Hospital chart reviewed.  No active bleeding as per patient.  High risk for recurrent hemorrhage.    8. Aorto-iliac atherosclerosis (CMS/HCC)  She is currently on no antiplatelet therapy or anticoagulation.    9. Coronary artery disease involving native coronary artery of native heart without angina pectoris  Continue beta-blocker.  At the present time she is not on statin therapy and there is no recent lipid profile available.  This will need to be addressed at future visits.    Return in about 4 months (around 2/11/2023) for follow-up, earlier if any change in symptoms.  We had a prolonged discussion about these complex clinical issues and went over the various important aspects to  consider. All questions were answered.  I spent 44 minutes on the date of service performing the specified activities.  Consultation with electrophysiology performed.  Available medical records reviewed and updated including laboratory data, imaging studies, previous outpatient and inpatient records.  Counseling/education performed. Care everywhere utilized where appropriate.    Thank you for allowing me to participate in the care of this patient.  I hope this information is helpful.      Christal Olvera MD Merged with [facility], [facility]  10/16/2022  11:34 AM  This document was generated utilizing voice recognition technology. Please excuse any typographical errors which may be present.

## 2022-11-02 PROBLEM — I49.1 ECTOPIC ATRIAL RHYTHM: Status: RESOLVED | Noted: 2019-01-16 | Resolved: 2022-11-02

## 2022-11-02 PROBLEM — J44.9 COPD (CHRONIC OBSTRUCTIVE PULMONARY DISEASE) (CMS/HCC): Status: ACTIVE | Noted: 2022-11-02

## 2022-11-02 PROBLEM — R94.31 ABNORMAL EKG: Status: RESOLVED | Noted: 2019-01-16 | Resolved: 2022-11-02

## 2022-11-02 PROBLEM — Z01.810 PREOPERATIVE CARDIOVASCULAR EXAMINATION: Status: RESOLVED | Noted: 2020-11-18 | Resolved: 2022-11-02

## 2022-11-02 PROBLEM — R00.2 PALPITATION: Status: RESOLVED | Noted: 2019-02-19 | Resolved: 2022-11-02

## 2022-11-04 ENCOUNTER — OFFICE VISIT (OUTPATIENT)
Dept: CARDIOLOGY | Facility: CLINIC | Age: 82
End: 2022-11-04
Payer: COMMERCIAL

## 2022-11-04 VITALS
HEIGHT: 59 IN | RESPIRATION RATE: 18 BRPM | WEIGHT: 192.4 LBS | SYSTOLIC BLOOD PRESSURE: 110 MMHG | DIASTOLIC BLOOD PRESSURE: 78 MMHG | HEART RATE: 111 BPM | BODY MASS INDEX: 38.79 KG/M2

## 2022-11-04 DIAGNOSIS — I48.19 PERSISTENT ATRIAL FIBRILLATION (CMS/HCC): Primary | ICD-10-CM

## 2022-11-04 PROCEDURE — 99215 OFFICE O/P EST HI 40 MIN: CPT | Performed by: INTERNAL MEDICINE

## 2022-11-04 PROCEDURE — 93000 ELECTROCARDIOGRAM COMPLETE: CPT | Performed by: INTERNAL MEDICINE

## 2022-11-04 PROCEDURE — 3008F BODY MASS INDEX DOCD: CPT | Performed by: INTERNAL MEDICINE

## 2022-11-04 RX ORDER — MIDODRINE HYDROCHLORIDE 2.5 MG/1
2 TABLET ORAL AS NEEDED
COMMUNITY
End: 2023-02-14

## 2022-11-04 ASSESSMENT — ENCOUNTER SYMPTOMS
ARTHRALGIAS: 0
DYSURIA: 0
DIZZINESS: 0
PALPITATIONS: 1
BRUISES/BLEEDS EASILY: 0
FATIGUE: 1
SHORTNESS OF BREATH: 1
APPETITE CHANGE: 0
WHEEZING: 0
ABDOMINAL PAIN: 0
MYALGIAS: 0
NERVOUS/ANXIOUS: 0

## 2022-11-04 NOTE — ASSESSMENT & PLAN NOTE
We discussed left atrial appendage closure in detail today.  I explained the rationale for considering left atrial appendage closure and described how the procedures performed.  I supplied her with some pamphlets and a website to explore the issue further.  In her case, she has a [id number] score of 4, giving her a strong indication for long-term thromboembolic prevention.  She was unable to tolerate even a short course of warfarin.  She has a history of GI bleeding with no defined reversible cause.    The difficult issue with Watchman or any other similar device is that she would need 6 months of anticoagulation with either dual antiplatelet agents or an oral anticoagulant.  She is at very high risk for developing recurrent GI bleeding with either of these strategies.  Alternatively, she could be referred for surgical closure of her left atrial appendage.  Given her chronic medical conditions, this may not be the best treatment option for her.  After she has had some time to think about it, if she is interested in undergoing the procedure I would be willing to have a discussion with her, Dr. Wilkins and Dr. Boogie about the risks and benefits and we may be willing to consider moving forward with the procedure.  For today, this was a useful informational session and I did not make any changes in her medications.

## 2022-11-04 NOTE — LETTER
November 4, 2022     Christal Olvera MD  [address] [facility] [address]    Patient: Chica Hurd  YOB: 1940  Date of Visit: 11/4/2022      Dear Dr. Saul Olvera:    Thank you for referring Chica Hurd to me for evaluation. Below are my notes for this consultation.    If you have questions, please do not hesitate to call me. I look forward to following your patient along with you.         Sincerely,        Frenie Plummer MD        CC: MD Sterling Cowart DO Kar-Lai Wong, MD Esberg, Fernie GORMAN MD  11/4/2022 11:52 AM  Signed       Electrophysiology       Reason for visit:   Chief Complaint   Patient presents with    Atrial Fibrillation      HPI    Chica Hurd is a 82 y.o. female who comes to the office today to discuss left atrial appendage closure.  She was diagnosed with atrial fibrillation earlier this year and was started on warfarin.  She had been on warfarin for less than a week when she developed significant GI bleeding.  Endoscopy did not show any clear source or reversible cause of bleeding.  She has been off anticoagulation since then without any recurrent GI bleeding.  She denies chest pains, but has shortness of breath with activity and occasionally gets palpitations.      Past Medical History:   Diagnosis Date    Anemia     Asthma     Atrial fibrillation (CMS/HCC)     Initially paroxysmal, but now persistent    Cataracts, bilateral     Colon polyp     Colonic diverticular disease     COPD (chronic obstructive pulmonary disease) (CMS/HCC)     Hiatal hernia     Hypertension     Lipid disorder     Peptic ulcer disease     Pre-diabetes     Renal failure     Rheumatoid arthritis (CMS/HCC)      Past Surgical History:   Procedure Laterality Date    AV FISTULA PLACEMENT Left 2018    LUE fistula    CATARACT EXTRACTION W/  INTRAOCULAR LENS IMPLANT  2013    HEMORRHOID SURGERY      HYSTERECTOMY      TOTAL HIP ARTHROPLASTY Bilateral      TYMPANOPLASTY       Allergies:  Penicillins    Current Outpatient Medications   Medication Sig Dispense Refill    acetaminophen (TYLENOL) 500 mg tablet Take 500 mg by mouth every 12 (twelve) hours.      albuterol 2.5 mg /3 mL (0.083 %) nebulizer solution Us as directed      budesonide-formoteroL (SYMBICORT) 160-4.5 mcg/actuation inhaler Inhale 1 puff 2 (two) times a day.        escitalopram (LEXAPRO) 10 mg tablet Take 5 mg by mouth daily.        fluticasone (FLONASE) 50 mcg/actuation nasal spray Administer 1 spray into each nostril daily.        folic acid (FOLVITE) 1 mg tablet Take 1 mg by mouth daily.      LORazepam (ATIVAN) 0.5 mg tablet Take 0.5 mg by mouth 2 (two) times a day as needed.        magnesium hydroxide (M.O.M.) 400 mg/5 mL suspension Take 30 mL by mouth daily as needed.      metoprolol succinate XL (TOPROL-XL) 50 mg 24 hr tablet Take 1 tablet (50 mg total) by mouth 2 (two) times a day. 180 tablet 3    midodrine (PROAMATINE) 2.5 mg tablet Take 2 tablets by mouth as needed.      pantoprazole (PROTONIX) 40 mg EC tablet Take 40 mg by mouth 2 times daily.      predniSONE (DELTASONE) 5 mg tablet Take 5 mg by mouth daily.        senna (SENOKOT) 8.6 mg tablet Take 1 tablet by mouth daily.      sevelamer (RENVELA) 800 mg tablet TAKE 1 TABLET BY ORAL ROUTE 3 TIMES EVERY DAY WITH FOOD  3     No current facility-administered medications for this visit.     Social History     Socioeconomic History    Marital status:      Spouse name: None    Number of children: None    Years of education: None    Highest education level: None   Tobacco Use    Smoking status: Former     Packs/day: 0.25     Types: Cigarettes     Quit date:      Years since quittin.8    Smokeless tobacco: Never   Vaping Use    Vaping Use: Never used   Substance and Sexual Activity    Alcohol use: No    Drug use: No    Sexual activity: Never     Family History   Problem Relation Age of Onset    Heart failure  Biological Father     Heart attack Biological Father 57    Hypertension Biological Father     Asthma Biological Mother     Heart attack Biological Mother 47    Diabetes Biological Sister     Heart attack Biological Brother      Review of Systems   Constitutional: Positive for fatigue. Negative for appetite change.   HENT: Negative for hearing loss and nosebleeds.    Eyes: Negative for visual disturbance.   Respiratory: Positive for shortness of breath. Negative for wheezing.    Cardiovascular: Positive for palpitations. Negative for chest pain.   Gastrointestinal: Negative for abdominal pain.   Genitourinary: Negative for dysuria.   Musculoskeletal: Negative for arthralgias and myalgias.   Skin: Negative for rash.   Neurological: Negative for dizziness.   Hematological: Does not bruise/bleed easily.   Psychiatric/Behavioral: The patient is not nervous/anxious.      Vitals:    11/04/22 1051   BP: 110/78   Pulse: (!) 111   Resp: 18     Wt Readings from Last 3 Encounters:   11/04/22 87.3 kg (192 lb 6.4 oz)   10/11/22 87.1 kg (192 lb)   09/20/22 88 kg (194 lb)     Physical Exam  Constitutional:       General: She is not in acute distress.     Appearance: She is well-developed.   HENT:      Head: Atraumatic.   Eyes:      General: No scleral icterus.  Neck:      Thyroid: No thyromegaly.   Cardiovascular:      Rate and Rhythm: Rhythm irregularly irregular.      Heart sounds: No murmur heard.  Pulmonary:      Effort: No respiratory distress.      Breath sounds: Normal breath sounds.   Abdominal:      Palpations: Abdomen is soft.      Tenderness: There is no abdominal tenderness.   Musculoskeletal:         General: No deformity.      Right lower leg: Edema present.      Left lower leg: Edema present.   Skin:     Findings: No rash.   Neurological:      Mental Status: She is alert.      Motor: No abnormal muscle tone.   Psychiatric:         Mood and Affect: Mood and affect normal.          Labs and test results  personally reviewed by me:    No results found for: WBC, HGB, HCT, PLT, CHOL, TRIG, HDL, LDLCALC, ALT, AST, NA, K, CREATININE, BUN, TSH, INR    Cardiac Imaging    TRANSTHORACIC ECHO (TTE) COMPLETE 11/04/2021    Interpretation Summary  · Normal LV size with mild concentric LVH.  LVEF 65-70%.  Wall motion appears grossly normal.  No LVOT gradient.  · Mildly dilated RV with low normal function.  Moderate TR with estimated PASP 50-55 mmHg.  Moderately dilated right atrium.  · Severely dilated left atrium.  Grade to inflow pattern consistent with elevated filling pressures.  · The aortic valve is trileaflet with moderate diffuse calcification.  Peak AV velocity 2.87 m/s with mean gradient 16 mmHg.  Calculated ANGELA 1.4 cm².  ANGELA by planimetry 1.4 cm².  Dimensionless index greater than 0.5.  Findings are consistent with mild, at most mild to moderate aortic stenosis.  Trace aortic insufficiency.  · The aorta appears sclerotic with calcifications near the sinuses of Valsalva.  Maximum ascending aortic diameter 3.9 cm with mild ectasia.  · The mitral valve leaflets are thickened.  The posterior leaflet is moderately restricted.  There is moderate regurgitation directed somewhat posteriorly.  · Dilated IVC with greater than 50% respiratory collapse.  · Trivial pericardial effusion.  · Compared to echocardiogram 2017 at outside institution, there has been increase in mitral regurgitation and pulmonary pressures.  Filling pressures are now elevated.          ECG personally read and interpreted by me today: Atrial fibrillation with an average ventricular response of 111 bpm and nonspecific T wave abnormality.       Persistent atrial fibrillation (CMS/HCC)  We discussed left atrial appendage closure in detail today.  I explained the rationale for considering left atrial appendage closure and described how the procedures performed.  I supplied her with some pamphlets and a website to explore the issue further.  In her case, she has  a [id number] score of 4, giving her a strong indication for long-term thromboembolic prevention.  She was unable to tolerate even a short course of warfarin.  She has a history of GI bleeding with no defined reversible cause.    The difficult issue with Watchman or any other similar device is that she would need 6 months of anticoagulation with either dual antiplatelet agents or an oral anticoagulant.  She is at very high risk for developing recurrent GI bleeding with either of these strategies.  Alternatively, she could be referred for surgical closure of her left atrial appendage.  Given her chronic medical conditions, this may not be the best treatment option for her.  After she has had some time to think about it, if she is interested in undergoing the procedure I would be willing to have a discussion with her, Dr. Wilkins and Dr. Boogie about the risks and benefits and we may be willing to consider moving forward with the procedure.  For today, this was a useful informational session and I did not make any changes in her medications.      This letter was generated using speech recognition software. Please excuse any typographical errors.       Fernie Plummer MD  11/4/2022

## 2022-11-04 NOTE — PROGRESS NOTES
Electrophysiology       Reason for visit:   Chief Complaint   Patient presents with    Atrial Fibrillation      HPI   Chica Hurd is a 82 y.o. female who comes to the office today to discuss left atrial appendage closure.  She was diagnosed with atrial fibrillation earlier this year and was started on warfarin.  She had been on warfarin for less than a week when she developed significant GI bleeding.  Endoscopy did not show any clear source or reversible cause of bleeding.  She has been off anticoagulation since then without any recurrent GI bleeding.  She denies chest pains, but has shortness of breath with activity and occasionally gets palpitations.      Past Medical History:   Diagnosis Date    Anemia     Asthma     Atrial fibrillation (CMS/HCC)     Initially paroxysmal, but now persistent    Cataracts, bilateral     Colon polyp     Colonic diverticular disease     COPD (chronic obstructive pulmonary disease) (CMS/HCC)     Hiatal hernia     Hypertension     Lipid disorder     Peptic ulcer disease     Pre-diabetes     Renal failure     Rheumatoid arthritis (CMS/HCC)      Past Surgical History:   Procedure Laterality Date    AV FISTULA PLACEMENT Left 2018    LUE fistula    CATARACT EXTRACTION W/  INTRAOCULAR LENS IMPLANT  2013    HEMORRHOID SURGERY      HYSTERECTOMY      TOTAL HIP ARTHROPLASTY Bilateral     TYMPANOPLASTY       Allergies:  Penicillins    Current Outpatient Medications   Medication Sig Dispense Refill    acetaminophen (TYLENOL) 500 mg tablet Take 500 mg by mouth every 12 (twelve) hours.      albuterol 2.5 mg /3 mL (0.083 %) nebulizer solution Us as directed      budesonide-formoteroL (SYMBICORT) 160-4.5 mcg/actuation inhaler Inhale 1 puff 2 (two) times a day.        escitalopram (LEXAPRO) 10 mg tablet Take 5 mg by mouth daily.        fluticasone (FLONASE) 50 mcg/actuation nasal spray Administer 1 spray into each nostril daily.        folic acid (FOLVITE) 1 mg  tablet Take 1 mg by mouth daily.      LORazepam (ATIVAN) 0.5 mg tablet Take 0.5 mg by mouth 2 (two) times a day as needed.        magnesium hydroxide (M.O.M.) 400 mg/5 mL suspension Take 30 mL by mouth daily as needed.      metoprolol succinate XL (TOPROL-XL) 50 mg 24 hr tablet Take 1 tablet (50 mg total) by mouth 2 (two) times a day. 180 tablet 3    midodrine (PROAMATINE) 2.5 mg tablet Take 2 tablets by mouth as needed.      pantoprazole (PROTONIX) 40 mg EC tablet Take 40 mg by mouth 2 times daily.      predniSONE (DELTASONE) 5 mg tablet Take 5 mg by mouth daily.        senna (SENOKOT) 8.6 mg tablet Take 1 tablet by mouth daily.      sevelamer (RENVELA) 800 mg tablet TAKE 1 TABLET BY ORAL ROUTE 3 TIMES EVERY DAY WITH FOOD  3     No current facility-administered medications for this visit.     Social History     Socioeconomic History    Marital status:      Spouse name: None    Number of children: None    Years of education: None    Highest education level: None   Tobacco Use    Smoking status: Former     Packs/day: 0.25     Types: Cigarettes     Quit date:      Years since quittin.8    Smokeless tobacco: Never   Vaping Use    Vaping Use: Never used   Substance and Sexual Activity    Alcohol use: No    Drug use: No    Sexual activity: Never     Family History   Problem Relation Age of Onset    Heart failure Biological Father     Heart attack Biological Father 57    Hypertension Biological Father     Asthma Biological Mother     Heart attack Biological Mother 47    Diabetes Biological Sister     Heart attack Biological Brother      Review of Systems   Constitutional: Positive for fatigue. Negative for appetite change.   HENT: Negative for hearing loss and nosebleeds.    Eyes: Negative for visual disturbance.   Respiratory: Positive for shortness of breath. Negative for wheezing.    Cardiovascular: Positive for palpitations. Negative for chest pain.   Gastrointestinal:  Negative for abdominal pain.   Genitourinary: Negative for dysuria.   Musculoskeletal: Negative for arthralgias and myalgias.   Skin: Negative for rash.   Neurological: Negative for dizziness.   Hematological: Does not bruise/bleed easily.   Psychiatric/Behavioral: The patient is not nervous/anxious.      Vitals:    11/04/22 1051   BP: 110/78   Pulse: (!) 111   Resp: 18     Wt Readings from Last 3 Encounters:   11/04/22 87.3 kg (192 lb 6.4 oz)   10/11/22 87.1 kg (192 lb)   09/20/22 88 kg (194 lb)     Physical Exam  Constitutional:       General: She is not in acute distress.     Appearance: She is well-developed.   HENT:      Head: Atraumatic.   Eyes:      General: No scleral icterus.  Neck:      Thyroid: No thyromegaly.   Cardiovascular:      Rate and Rhythm: Rhythm irregularly irregular.      Heart sounds: No murmur heard.  Pulmonary:      Effort: No respiratory distress.      Breath sounds: Normal breath sounds.   Abdominal:      Palpations: Abdomen is soft.      Tenderness: There is no abdominal tenderness.   Musculoskeletal:         General: No deformity.      Right lower leg: Edema present.      Left lower leg: Edema present.   Skin:     Findings: No rash.   Neurological:      Mental Status: She is alert.      Motor: No abnormal muscle tone.   Psychiatric:         Mood and Affect: Mood and affect normal.          Labs and test results personally reviewed by me:    No results found for: WBC, HGB, HCT, PLT, CHOL, TRIG, HDL, LDLCALC, ALT, AST, NA, K, CREATININE, BUN, TSH, INR    Cardiac Imaging    TRANSTHORACIC ECHO (TTE) COMPLETE 11/04/2021    Interpretation Summary  · Normal LV size with mild concentric LVH.  LVEF 65-70%.  Wall motion appears grossly normal.  No LVOT gradient.  · Mildly dilated RV with low normal function.  Moderate TR with estimated PASP 50-55 mmHg.  Moderately dilated right atrium.  · Severely dilated left atrium.  Grade to inflow pattern consistent with elevated filling pressures.  · The  aortic valve is trileaflet with moderate diffuse calcification.  Peak AV velocity 2.87 m/s with mean gradient 16 mmHg.  Calculated ANGELA 1.4 cm².  ANGELA by planimetry 1.4 cm².  Dimensionless index greater than 0.5.  Findings are consistent with mild, at most mild to moderate aortic stenosis.  Trace aortic insufficiency.  · The aorta appears sclerotic with calcifications near the sinuses of Valsalva.  Maximum ascending aortic diameter 3.9 cm with mild ectasia.  · The mitral valve leaflets are thickened.  The posterior leaflet is moderately restricted.  There is moderate regurgitation directed somewhat posteriorly.  · Dilated IVC with greater than 50% respiratory collapse.  · Trivial pericardial effusion.  · Compared to echocardiogram 2017 at outside institution, there has been increase in mitral regurgitation and pulmonary pressures.  Filling pressures are now elevated.          ECG personally read and interpreted by me today: Atrial fibrillation with an average ventricular response of 111 bpm and nonspecific T wave abnormality.       Persistent atrial fibrillation (CMS/HCC)  We discussed left atrial appendage closure in detail today.  I explained the rationale for considering left atrial appendage closure and described how the procedures performed.  I supplied her with some pamphlets and a website to explore the issue further.  In her case, she has a [id number] score of 4, giving her a strong indication for long-term thromboembolic prevention.  She was unable to tolerate even a short course of warfarin.  She has a history of GI bleeding with no defined reversible cause.    The difficult issue with Watchman or any other similar device is that she would need 6 months of anticoagulation with either dual antiplatelet agents or an oral anticoagulant.  She is at very high risk for developing recurrent GI bleeding with either of these strategies.  Alternatively, she could be referred for surgical closure of her left atrial  appendage.  Given her chronic medical conditions, this may not be the best treatment option for her.  After she has had some time to think about it, if she is interested in undergoing the procedure I would be willing to have a discussion with her, Dr. Wilkins and Dr. Boogie about the risks and benefits and we may be willing to consider moving forward with the procedure.  For today, this was a useful informational session and I did not make any changes in her medications.      This letter was generated using speech recognition software. Please excuse any typographical errors.       Fernie Plummer MD  11/4/2022

## 2022-11-08 ENCOUNTER — TELEPHONE (OUTPATIENT)
Dept: SCHEDULING | Facility: CLINIC | Age: 82
End: 2022-11-08

## 2022-11-08 NOTE — TELEPHONE ENCOUNTER
I called the dialysis unit in the past about stopping the INR draws since Chica is no longer on warfarin.  Will call again.

## 2022-11-08 NOTE — TELEPHONE ENCOUNTER
INR CALL    Name of caller: Juany Mejia [phone]    INR Result: .89    Date INR was drawn: 11/7/22    Contact number: [phone]      Please contact pt with dosing instructions. Thank you.

## 2022-11-08 NOTE — TELEPHONE ENCOUNTER
Pt of Dr. Hughes. Please see message below regarding INR result. Appears warfarin was d/c'd at last OV 10/11/22 with Dr. Hughes and pt had consult with Dr. Plummer possible Watchman or surgical MEHREEN closure.    Thank you.

## 2022-11-15 ENCOUNTER — OFFICE VISIT (OUTPATIENT)
Dept: CARDIOLOGY | Facility: CLINIC | Age: 82
End: 2022-11-15
Payer: COMMERCIAL

## 2022-11-15 VITALS
HEIGHT: 59 IN | HEART RATE: 101 BPM | RESPIRATION RATE: 16 BRPM | SYSTOLIC BLOOD PRESSURE: 100 MMHG | BODY MASS INDEX: 38.22 KG/M2 | DIASTOLIC BLOOD PRESSURE: 60 MMHG | WEIGHT: 189.6 LBS

## 2022-11-15 DIAGNOSIS — I48.19 PERSISTENT ATRIAL FIBRILLATION (CMS/HCC): ICD-10-CM

## 2022-11-15 DIAGNOSIS — I48.91 ATRIAL FIBRILLATION, UNSPECIFIED TYPE (CMS/HCC): Primary | ICD-10-CM

## 2022-11-15 PROCEDURE — 99214 OFFICE O/P EST MOD 30 MIN: CPT | Performed by: INTERNAL MEDICINE

## 2022-11-15 PROCEDURE — 3008F BODY MASS INDEX DOCD: CPT | Performed by: INTERNAL MEDICINE

## 2022-11-15 PROCEDURE — 93000 ELECTROCARDIOGRAM COMPLETE: CPT | Performed by: INTERNAL MEDICINE

## 2022-11-15 RX ORDER — METOPROLOL SUCCINATE 50 MG/1
25 TABLET, EXTENDED RELEASE ORAL 2 TIMES DAILY
COMMUNITY
Start: 2022-11-15

## 2022-11-15 ASSESSMENT — ENCOUNTER SYMPTOMS
SYNCOPE: 0
ORTHOPNEA: 0
BACK PAIN: 1
COUGH: 0
DYSPNEA ON EXERTION: 1
DIZZINESS: 0
HEMATOCHEZIA: 0
NEAR-SYNCOPE: 0
HEMATEMESIS: 0
SHORTNESS OF BREATH: 1
WEIGHT LOSS: 0
STIFFNESS: 1
LIGHT-HEADEDNESS: 0
PALPITATIONS: 0

## 2022-11-15 NOTE — PROGRESS NOTES
Electrophysiology Office Note  Eliazar Boogie MD [facility]  [phone] (P)  [phone] (F)   Chica Hurd  YOB: 1940  Date of visit: 11/15/2022        Chica Hurd is a 82 y.o. female who is referred to see me today for evaluation and management of persistent atrial fibrillation.     Patient carries history of hypertension, COPD, end-stage renal disease on dialysis.  Echocardiogram in 2019 showed preserved LV function but severe dilated left atria.  She was known to have frequent atrial ectopies.  She was seen in cardiology office in July 2022 for routine follow-up and was noted to be in atrial fibrillation with rapid ventricular response.  She was asymptomatic.  Carvedilol was increased.  She was started on apixaban 2.5 mg twice daily.  She carries history of GI bleeding in 2020 and was noted to have colonic polyps and nonbleeding gastric ulcer and gastritis.  She has chronic anemia on iron supplement.  On apixaban, patient reports that her dark stool remains unchanged.    Apixaban was changed into warfarin.  Within 1 week, patient suffered to severe GI bleeding with Hgb decreased to 6. . GI performed an EGD showing a small gastric antrum diverticulum and prior peptic ulcer disease, but there was no active bleeding.   Warfarin was discontinued.  She saw Dr. Plummer for consideration of atrial appendage exclusion procedure.    Patient denies recurrent hematochezia.  Her hemoglobin has been stable.  She is extremely concerned for risk of stroke as her friend was disabled after stroke.  She is taking metoprolol 25 mg twice daily limited by hypotension.  She complained of lower extremity edema.  She denies dizziness, syncope, presyncope.  She denies palpitation.  She denies neurological symptoms.        Past medical history, past surgical history, social history and family history are reviewed.  Pertinent changes are updated in the electronic medical record.    Past Medical History:  End-stage renal  disease on hemodialysis  Rectal bleeding  COPD  Hypertension  Rheumatoid arthritis  Paroxysmal atrial fibrillation  COVID infection January 2022     Past Surgical History:  AV fistula revisions  Hemorrhoid surgery  Bilateral total hip replacement  Hysterectomy     Social History:  Patient lives alone.  She still drives.  Her family lives in [place].  She has a cousin lives close by. She smoked in distant past and stopped in 1980's. She does not drink alcohol     Family History:  Father hd MI at age 57  Mother had MI at age 47    Allergies:  Penicillins      Current Outpatient Medications:     acetaminophen (TYLENOL) 500 mg tablet, Take 500 mg by mouth every 12 (twelve) hours., Disp: , Rfl:     albuterol 2.5 mg /3 mL (0.083 %) nebulizer solution, Us as directed, Disp: , Rfl:     budesonide-formoteroL (SYMBICORT) 160-4.5 mcg/actuation inhaler, Inhale 1 puff 2 (two) times a day.  , Disp: , Rfl:     escitalopram (LEXAPRO) 10 mg tablet, Take 5 mg by mouth daily.  , Disp: , Rfl:     fluticasone (FLONASE) 50 mcg/actuation nasal spray, Administer 1 spray into each nostril daily.  , Disp: , Rfl:     folic acid (FOLVITE) 1 mg tablet, Take 1 mg by mouth daily., Disp: , Rfl:     LORazepam (ATIVAN) 0.5 mg tablet, Take 0.5 mg by mouth 2 (two) times a day as needed.  , Disp: , Rfl:     magnesium hydroxide (M.O.M.) 400 mg/5 mL suspension, Take 30 mL by mouth daily as needed., Disp: , Rfl:     metoprolol succinate XL (TOPROL-XL) 50 mg 24 hr tablet, Take 0.5 tablets (25 mg total) by mouth 2 (two) times a day., Disp: , Rfl:     predniSONE (DELTASONE) 5 mg tablet, Take 5 mg by mouth daily.  , Disp: , Rfl:     senna (SENOKOT) 8.6 mg tablet, Take 1 tablet by mouth daily., Disp: , Rfl:     sevelamer (RENVELA) 800 mg tablet, TAKE 1 TABLET BY ORAL ROUTE 3 TIMES EVERY DAY WITH FOOD, Disp: , Rfl: 3    midodrine (PROAMATINE) 2.5 mg tablet, Take 2 tablets by mouth as needed., Disp: , Rfl:     Review of Systems   Constitutional: Negative  "for weight loss.   Cardiovascular: Positive for dyspnea on exertion and leg swelling. Negative for chest pain, near-syncope, orthopnea, palpitations and syncope.   Respiratory: Positive for shortness of breath. Negative for cough.    Hematologic/Lymphatic: Negative for bleeding problem.   Musculoskeletal: Positive for arthritis, back pain, joint pain and stiffness.   Gastrointestinal: Negative for hematemesis, hematochezia and hemorrhoids.   Neurological: Negative for dizziness and light-headedness.     Objective   Vitals:    11/15/22 0957   BP: 100/60   BP Location: Left upper arm   Patient Position: Sitting   Pulse: (!) 101   Resp: 16   Weight: 86 kg (189 lb 9.6 oz)   Height: 1.499 m (4' 11\")     Physical Exam  Constitutional:       Appearance: She is well-developed.   HENT:      Head: Normocephalic and atraumatic.      Right Ear: External ear normal.      Left Ear: External ear normal.      Nose: Nose normal.   Eyes:      Conjunctiva/sclera: Conjunctivae normal.   Neck:      Thyroid: No thyroid mass.      Vascular: No carotid bruit or JVD.      Trachea: Trachea normal.   Cardiovascular:      Rate and Rhythm: Tachycardia present. Rhythm irregular.      Pulses:           Carotid pulses are 2+ on the right side and 2+ on the left side.     Heart sounds: Murmur heard.   Pulmonary:      Effort: Pulmonary effort is normal.      Breath sounds: Normal breath sounds.   Abdominal:      General: There is no distension.      Palpations: Abdomen is soft. There is no mass.   Musculoskeletal:      Cervical back: Normal range of motion and neck supple.      Comments: 2+ pitting edema bilateral feet; AV fistula at left forearm.    Skin:     General: Skin is warm and dry.   Neurological:      Mental Status: She is alert and oriented to person, place, and time.   Psychiatric:         Behavior: Behavior normal. Behavior is cooperative.        ECG: Atrial fibrillation with rate 100 bpm.  Low voltage.  Nonspecific ST " changes.    Lab:(Personally reviewed)  10/5/2022  Hgb 7.9; Na 135; K 4.7; Bun 25; Cr 6.57  8/1/2022  Sodium 134, potassium 4.8, BUN 41, creatinine 7.2.  AST 23, ALT 25.  Hemoglobin 8.7.    Echocardiogram:   10/4/2022:   Performed at [facility]-normal LV size with mild concentric LVH.  LVEF 65-70%.  Atrial fibrillation throughout study.  Mildly dilated RV with preserved function.  Mildly dilated left atrium.  Moderate MAC.  Mild to moderate MR.  No mitral stenosis.  Calcified aortic valve with mild to moderate aortic stenosis.  Mean AV gradient 15 mmHg.  Calculated ANGELA 1.5 cm².  Moderate to severe TR.  RVSP 55 mmHg.  Trivial pericardial effusion.  11/4/2021   Normal LV size with mild concentric LVH.  LVEF 65-70%.  Wall motion appears grossly normal.  No LVOT gradient.   Mildly dilated RV with low normal function.  Moderate TR with estimated PASP 50-55 mmHg.  Moderately dilated right atrium.   Severely dilated left atrium.  Grade to inflow pattern consistent with elevated filling pressures.   The aortic valve is trileaflet with moderate diffuse calcification.  Peak AV velocity 2.87 m/s with mean gradient 16 mmHg.  Calculated ANGELA 1.4 cm².  ANGELA by planimetry 1.4 cm².  Dimensionless index greater than 0.5.  Findings are consistent with mild, at most mild to moderate aortic stenosis.  Trace aortic insufficiency.   The aorta appears sclerotic with calcifications near the sinuses of Valsalva.  Maximum ascending aortic diameter 3.9 cm with mild ectasia.   The mitral valve leaflets are thickened.  The posterior leaflet is moderately restricted.  There is moderate regurgitation directed somewhat posteriorly.   Dilated IVC with greater than 50% respiratory collapse.   Trivial pericardial effusion.   Compared to echocardiogram 2017 at outside institution, there has been increase in mitral regurgitation and pulmonary pressures.  Filling pressures are now elevated.    Holter:  7/28/2022  1. The patient was  monitored for 24 hours.  2. The predominant rhythm was  atrial fibrillation (100% of recording duration).  3. The average heart rate was 100 bpm.  4. The minimum heart rate was 77 bpm.  5. The maximum heart rate was 143 bpm.  6. There were 168 premature ventricular beats. There were 5 ventricular couplets.  7. Patient did not log any symptoms.     Assessment   Persistent atrial fibrillation (CMS/HCC)  Patient has persistent atrial fibrillation for which she is asymptomatic.  It is difficult to use AV bogdan agent with baseline idiopathic hypotension.  Average HR was 100 bpm by Holter monitor in July 2022.  She is now maintained on low-dose metoprolol with no room for up titration.  She is currently not on anticoagulation after her GI bleeding event in September.  No recurrent bleeding in the past 2 months of anticoagulation.    I discussed with the patient at length regarding risk of stroke and risk of bleeding.  She met with Dr. Plummer to consider watchman procedure versus surgical option.  Patient is interested in proceeding with watchman procedure.  We reviewed again duration of the procedure and the risk.  She understands 5% failure rate.  We will try to resume low-dose apixaban post watchman device implantation given she has not had bleeding in the past 2 months.    6 weeks after watchman device implantation, we can consider use of low-dose amiodarone aiming for rate control and we can discontinue metoprolol thereafter.  All of the questions were answered to satisfactions.  I discussed her care with Dr. Hughes.            It is my pleasure to be involved in this patient's care.  Please do not hesitate to call if I could be of any assistance.     Renetta Boogie MD, MD, [facility]

## 2022-11-15 NOTE — LETTER
November 15, 2022     Alex Monique MD  [address] [facility] [address]    Patient: Chica Hurd  YOB: 1940  Date of Visit: 11/15/2022      Dear Dr. Monique:    Thank you for referring Chica Hurd to me for evaluation. Below are my notes for this consultation.    If you have questions, please do not hesitate to call me. I look forward to following your patient along with you.         Sincerely,        Renetta Boogie MD        CC: MD Sterling Finley DO Douglas B Esberg, MD Wong, MD Eliazar  11/15/2022 11:24 AM  Signed     Electrophysiology Office Note  Eliazar Boogie MD [facility]  [phone] (P)  [phone] (F)   Chica Hurd  YOB: 1940  Date of visit: 11/15/2022        Chica Hurd is a 82 y.o. female who is referred to see me today for evaluation and management of persistent atrial fibrillation.     Patient carries history of hypertension, COPD, end-stage renal disease on dialysis.  Echocardiogram in 2019 showed preserved LV function but severe dilated left atria.  She was known to have frequent atrial ectopies.  She was seen in cardiology office in July 2022 for routine follow-up and was noted to be in atrial fibrillation with rapid ventricular response.  She was asymptomatic.  Carvedilol was increased.  She was started on apixaban 2.5 mg twice daily.  She carries history of GI bleeding in 2020 and was noted to have colonic polyps and nonbleeding gastric ulcer and gastritis.  She has chronic anemia on iron supplement.  On apixaban, patient reports that her dark stool remains unchanged.    Apixaban was changed into warfarin.  Within 1 week, patient suffered to severe GI bleeding with Hgb decreased to 6. . GI performed an EGD showing a small gastric antrum diverticulum and prior peptic ulcer disease, but there was no active bleeding.   Warfarin was discontinued.  She saw Dr. Plummer for consideration of atrial appendage exclusion  procedure.    Patient denies recurrent hematochezia.  Her hemoglobin has been stable.  She is extremely concerned for risk of stroke as her friend was disabled after stroke.  She is taking metoprolol 25 mg twice daily limited by hypotension.  She complained of lower extremity edema.  She denies dizziness, syncope, presyncope.  She denies palpitation.  She denies neurological symptoms.        Past medical history, past surgical history, social history and family history are reviewed.  Pertinent changes are updated in the electronic medical record.    Past Medical History:  End-stage renal disease on hemodialysis  Rectal bleeding  COPD  Hypertension  Rheumatoid arthritis  Paroxysmal atrial fibrillation  COVID infection January 2022     Past Surgical History:  AV fistula revisions  Hemorrhoid surgery  Bilateral total hip replacement  Hysterectomy     Social History:  Patient lives alone.  She still drives.  Her family lives in [place].  She has a cousin lives close by. She smoked in distant past and stopped in 1980's. She does not drink alcohol     Family History:  Father hd MI at age 57  Mother had MI at age 47    Allergies:  Penicillins      Current Outpatient Medications:     acetaminophen (TYLENOL) 500 mg tablet, Take 500 mg by mouth every 12 (twelve) hours., Disp: , Rfl:     albuterol 2.5 mg /3 mL (0.083 %) nebulizer solution, Us as directed, Disp: , Rfl:     budesonide-formoteroL (SYMBICORT) 160-4.5 mcg/actuation inhaler, Inhale 1 puff 2 (two) times a day.  , Disp: , Rfl:     escitalopram (LEXAPRO) 10 mg tablet, Take 5 mg by mouth daily.  , Disp: , Rfl:     fluticasone (FLONASE) 50 mcg/actuation nasal spray, Administer 1 spray into each nostril daily.  , Disp: , Rfl:     folic acid (FOLVITE) 1 mg tablet, Take 1 mg by mouth daily., Disp: , Rfl:     LORazepam (ATIVAN) 0.5 mg tablet, Take 0.5 mg by mouth 2 (two) times a day as needed.  , Disp: , Rfl:     magnesium hydroxide (M.O.M.) 400 mg/5 mL suspension, Take  "30 mL by mouth daily as needed., Disp: , Rfl:     metoprolol succinate XL (TOPROL-XL) 50 mg 24 hr tablet, Take 0.5 tablets (25 mg total) by mouth 2 (two) times a day., Disp: , Rfl:     predniSONE (DELTASONE) 5 mg tablet, Take 5 mg by mouth daily.  , Disp: , Rfl:     senna (SENOKOT) 8.6 mg tablet, Take 1 tablet by mouth daily., Disp: , Rfl:     sevelamer (RENVELA) 800 mg tablet, TAKE 1 TABLET BY ORAL ROUTE 3 TIMES EVERY DAY WITH FOOD, Disp: , Rfl: 3    midodrine (PROAMATINE) 2.5 mg tablet, Take 2 tablets by mouth as needed., Disp: , Rfl:     Review of Systems   Constitutional: Negative for weight loss.   Cardiovascular: Positive for dyspnea on exertion and leg swelling. Negative for chest pain, near-syncope, orthopnea, palpitations and syncope.   Respiratory: Positive for shortness of breath. Negative for cough.    Hematologic/Lymphatic: Negative for bleeding problem.   Musculoskeletal: Positive for arthritis, back pain, joint pain and stiffness.   Gastrointestinal: Negative for hematemesis, hematochezia and hemorrhoids.   Neurological: Negative for dizziness and light-headedness.     Objective    Vitals:    11/15/22 0957   BP: 100/60   BP Location: Left upper arm   Patient Position: Sitting   Pulse: (!) 101   Resp: 16   Weight: 86 kg (189 lb 9.6 oz)   Height: 1.499 m (4' 11\")     Physical Exam  Constitutional:       Appearance: She is well-developed.   HENT:      Head: Normocephalic and atraumatic.      Right Ear: External ear normal.      Left Ear: External ear normal.      Nose: Nose normal.   Eyes:      Conjunctiva/sclera: Conjunctivae normal.   Neck:      Thyroid: No thyroid mass.      Vascular: No carotid bruit or JVD.      Trachea: Trachea normal.   Cardiovascular:      Rate and Rhythm: Tachycardia present. Rhythm irregular.      Pulses:           Carotid pulses are 2+ on the right side and 2+ on the left side.     Heart sounds: Murmur heard.   Pulmonary:      Effort: Pulmonary effort is normal.      " Breath sounds: Normal breath sounds.   Abdominal:      General: There is no distension.      Palpations: Abdomen is soft. There is no mass.   Musculoskeletal:      Cervical back: Normal range of motion and neck supple.      Comments: 2+ pitting edema bilateral feet; AV fistula at left forearm.    Skin:     General: Skin is warm and dry.   Neurological:      Mental Status: She is alert and oriented to person, place, and time.   Psychiatric:         Behavior: Behavior normal. Behavior is cooperative.        ECG: Atrial fibrillation with rate 100 bpm.  Low voltage.  Nonspecific ST changes.    Lab:(Personally reviewed)  10/5/2022  Hgb 7.9; Na 135; K 4.7; Bun 25; Cr 6.57  8/1/2022  Sodium 134, potassium 4.8, BUN 41, creatinine 7.2.  AST 23, ALT 25.  Hemoglobin 8.7.    Echocardiogram:   10/4/2022:   Performed at [facility]-normal LV size with mild concentric LVH.  LVEF 65-70%.  Atrial fibrillation throughout study.  Mildly dilated RV with preserved function.  Mildly dilated left atrium.  Moderate MAC.  Mild to moderate MR.  No mitral stenosis.  Calcified aortic valve with mild to moderate aortic stenosis.  Mean AV gradient 15 mmHg.  Calculated ANGELA 1.5 cm².  Moderate to severe TR.  RVSP 55 mmHg.  Trivial pericardial effusion.  11/4/2021   Normal LV size with mild concentric LVH.  LVEF 65-70%.  Wall motion appears grossly normal.  No LVOT gradient.   Mildly dilated RV with low normal function.  Moderate TR with estimated PASP 50-55 mmHg.  Moderately dilated right atrium.   Severely dilated left atrium.  Grade to inflow pattern consistent with elevated filling pressures.   The aortic valve is trileaflet with moderate diffuse calcification.  Peak AV velocity 2.87 m/s with mean gradient 16 mmHg.  Calculated ANGELA 1.4 cm².  ANGELA by planimetry 1.4 cm².  Dimensionless index greater than 0.5.  Findings are consistent with mild, at most mild to moderate aortic stenosis.  Trace aortic insufficiency.   The aorta appears  sclerotic with calcifications near the sinuses of Valsalva.  Maximum ascending aortic diameter 3.9 cm with mild ectasia.   The mitral valve leaflets are thickened.  The posterior leaflet is moderately restricted.  There is moderate regurgitation directed somewhat posteriorly.   Dilated IVC with greater than 50% respiratory collapse.   Trivial pericardial effusion.   Compared to echocardiogram 2017 at outside institution, there has been increase in mitral regurgitation and pulmonary pressures.  Filling pressures are now elevated.    Holter:  7/28/2022  1. The patient was monitored for 24 hours.  2. The predominant rhythm was  atrial fibrillation (100% of recording duration).  3. The average heart rate was 100 bpm.  4. The minimum heart rate was 77 bpm.  5. The maximum heart rate was 143 bpm.  6. There were 168 premature ventricular beats. There were 5 ventricular couplets.  7. Patient did not log any symptoms.     Assessment    Persistent atrial fibrillation (CMS/HCC)  Patient has persistent atrial fibrillation for which she is asymptomatic.  It is difficult to use AV bogdan agent with baseline idiopathic hypotension.  Average HR was 100 bpm by Holter monitor in July 2022.  She is now maintained on low-dose metoprolol with no room for up titration.  She is currently not on anticoagulation after her GI bleeding event in September.  No recurrent bleeding in the past 2 months of anticoagulation.    I discussed with the patient at length regarding risk of stroke and risk of bleeding.  She met with Dr. Plummer to consider watchman procedure versus surgical option.  Patient is interested in proceeding with watchman procedure.  We reviewed again duration of the procedure and the risk.  She understands 5% failure rate.  We will try to resume low-dose apixaban post watchman device implantation given she has not had bleeding in the past 2 months.    6 weeks after watchman device implantation, we can consider use of low-dose  amiodarone aiming for rate control and we can discontinue metoprolol thereafter.  All of the questions were answered to satisfactions.  I discussed her care with Dr. Hughes.           It is my pleasure to be involved in this patient's care.  Please do not hesitate to call if I could be of any assistance.     Renetta Boogie MD, MD, FACC

## 2022-11-15 NOTE — ASSESSMENT & PLAN NOTE
Patient has persistent atrial fibrillation for which she is asymptomatic.  It is difficult to use AV bogdan agent with baseline idiopathic hypotension.  Average HR was 100 bpm by Holter monitor in July 2022.  She is now maintained on low-dose metoprolol with no room for up titration.  She is currently not on anticoagulation after her GI bleeding event in September.  No recurrent bleeding in the past 2 months of anticoagulation.    I discussed with the patient at length regarding risk of stroke and risk of bleeding.  She met with Dr. Plummer to consider watchman procedure versus surgical option.  Patient is interested in proceeding with watchman procedure.  We reviewed again duration of the procedure and the risk.  She understands 5% failure rate.  We will try to resume low-dose apixaban post watchman device implantation given she has not had bleeding in the past 2 months.    6 weeks after watchman device implantation, we can consider use of low-dose amiodarone aiming for rate control and we can discontinue metoprolol thereafter.  All of the questions were answered to satisfactions.  I discussed her care with Dr. Hughes.

## 2022-11-21 ENCOUNTER — TELEPHONE (OUTPATIENT)
Dept: CARDIOLOGY | Facility: HOSPITAL | Age: 82
End: 2022-11-21

## 2022-11-29 ENCOUNTER — TELEPHONE (OUTPATIENT)
Dept: CARDIOLOGY | Facility: HOSPITAL | Age: 82
End: 2022-11-29

## 2022-11-29 NOTE — TELEPHONE ENCOUNTER
2nd call attempt-LVM in detailed to pt and also emergency contact to schedule watchman procedure for 12/8/22 with Dr Plummer. No response as of yet.

## 2022-11-30 RX ORDER — ASPIRIN 81 MG/1
81 TABLET ORAL DAILY
Qty: 30 TABLET | Refills: 5 | Status: SHIPPED | OUTPATIENT
Start: 2022-11-30 | End: 2023-02-14

## 2022-11-30 RX ORDER — CLOPIDOGREL BISULFATE 75 MG/1
75 TABLET ORAL DAILY
Qty: 90 TABLET | Refills: 1 | Status: SHIPPED | OUTPATIENT
Start: 2022-11-30 | End: 2023-02-14

## 2022-12-01 ENCOUNTER — HOSPITAL ENCOUNTER (OUTPATIENT)
Facility: HOSPITAL | Age: 82
Setting detail: SURGERY ADMIT
End: 2022-12-01
Attending: INTERNAL MEDICINE | Admitting: INTERNAL MEDICINE
Payer: COMMERCIAL

## 2022-12-01 ENCOUNTER — PREP FOR CASE (OUTPATIENT)
Dept: CARDIOTHORACIC SURGERY | Facility: CLINIC | Age: 82
End: 2022-12-01

## 2022-12-01 DIAGNOSIS — I95.0 IDIOPATHIC HYPOTENSION: Primary | ICD-10-CM

## 2022-12-01 DIAGNOSIS — I48.11 LONGSTANDING PERSISTENT ATRIAL FIBRILLATION (CMS/HCC): Primary | ICD-10-CM

## 2022-12-01 DIAGNOSIS — I48.11 LONGSTANDING PERSISTENT ATRIAL FIBRILLATION (CMS/HCC): ICD-10-CM

## 2022-12-01 RX ORDER — CHLORHEXIDINE GLUCONATE ORAL RINSE 1.2 MG/ML
15 SOLUTION DENTAL ONCE
Status: CANCELLED | OUTPATIENT
Start: 2022-12-01 | End: 2022-12-01

## 2022-12-02 ENCOUNTER — TELEPHONE (OUTPATIENT)
Dept: CARDIOLOGY | Facility: CLINIC | Age: 82
End: 2022-12-02

## 2022-12-02 NOTE — TELEPHONE ENCOUNTER
I attempted to call pt 2 times and got VM both times.  Calling to find out what patients intolerance is to asa. Will need to be on asa/Plavix or Warfarin before watchmen procedure can be done otherwise will need to cancel.

## 2022-12-05 NOTE — TELEPHONE ENCOUNTER
I called patient to follow up on below message. Patient states that she has a history of bleeding ulcer when on ASA many years ago. She states that she cannot take Warfarin due to GI bleed. T/C using PPI for ulcer prophylaxis during the use of ASA + Plavix. Should we keep her scheduled for Watchman?

## 2022-12-05 NOTE — TELEPHONE ENCOUNTER
She should be canceled.  If she cannot safely take the needed anticoagulation, it would also be unsafe to place the watchman device.

## 2023-01-17 ENCOUNTER — APPOINTMENT (RX ONLY)
Dept: URBAN - METROPOLITAN AREA CLINIC 28 | Facility: CLINIC | Age: 83
Setting detail: DERMATOLOGY
End: 2023-01-17

## 2023-01-17 DIAGNOSIS — L663 OTHER SPECIFIED DISEASES OF HAIR AND HAIR FOLLICLES: ICD-10-CM

## 2023-01-17 DIAGNOSIS — L73.9 FOLLICULAR DISORDER, UNSPECIFIED: ICD-10-CM

## 2023-01-17 DIAGNOSIS — L738 OTHER SPECIFIED DISEASES OF HAIR AND HAIR FOLLICLES: ICD-10-CM

## 2023-01-17 PROBLEM — L30.9 DERMATITIS, UNSPECIFIED: Status: ACTIVE | Noted: 2023-01-17

## 2023-01-17 PROCEDURE — ? BIOPSY BY PUNCH METHOD

## 2023-01-17 PROCEDURE — 11104 PUNCH BX SKIN SINGLE LESION: CPT

## 2023-01-17 PROCEDURE — ? PRESCRIPTION MEDICATION MANAGEMENT

## 2023-01-17 PROCEDURE — ? PRESCRIPTION

## 2023-01-17 PROCEDURE — 11105 PUNCH BX SKIN EA SEP/ADDL: CPT

## 2023-01-17 PROCEDURE — ? COUNSELING

## 2023-01-17 RX ORDER — MUPIROCIN 20 MG/G
OINTMENT TOPICAL QDAY
Qty: 22 | Refills: 3 | Status: ERX | COMMUNITY
Start: 2023-01-17

## 2023-01-17 RX ADMIN — MUPIROCIN: 20 OINTMENT TOPICAL at 00:00

## 2023-01-17 ASSESSMENT — LOCATION DETAILED DESCRIPTION DERM
LOCATION DETAILED: LEFT ANTERIOR DISTAL UPPER ARM
LOCATION DETAILED: LEFT ANTERIOR SHOULDER
LOCATION DETAILED: RIGHT ANTERIOR DISTAL THIGH
LOCATION DETAILED: LEFT ANTERIOR PROXIMAL THIGH
LOCATION DETAILED: RIGHT ANTERIOR PROXIMAL UPPER ARM
LOCATION DETAILED: UPPER STERNUM

## 2023-01-17 ASSESSMENT — LOCATION ZONE DERM
LOCATION ZONE: ARM
LOCATION ZONE: TRUNK
LOCATION ZONE: LEG

## 2023-01-17 ASSESSMENT — LOCATION SIMPLE DESCRIPTION DERM
LOCATION SIMPLE: RIGHT UPPER ARM
LOCATION SIMPLE: LEFT THIGH
LOCATION SIMPLE: CHEST
LOCATION SIMPLE: LEFT UPPER ARM
LOCATION SIMPLE: RIGHT THIGH
LOCATION SIMPLE: LEFT SHOULDER

## 2023-01-17 NOTE — PROCEDURE: PRESCRIPTION MEDICATION MANAGEMENT
Initiate Treatment: mupirocin 2 % topical ointment: Apply a thin layer QDAY to AA of body
Detail Level: Zone
Render In Strict Bullet Format?: No

## 2023-01-17 NOTE — PROCEDURE: BIOPSY BY PUNCH METHOD

## 2023-02-14 ENCOUNTER — OFFICE VISIT (OUTPATIENT)
Dept: CARDIOLOGY | Facility: CLINIC | Age: 83
End: 2023-02-14
Payer: COMMERCIAL

## 2023-02-14 VITALS
HEIGHT: 59 IN | BODY MASS INDEX: 39.11 KG/M2 | HEART RATE: 103 BPM | WEIGHT: 194 LBS | SYSTOLIC BLOOD PRESSURE: 110 MMHG | RESPIRATION RATE: 18 BRPM | DIASTOLIC BLOOD PRESSURE: 70 MMHG

## 2023-02-14 DIAGNOSIS — R94.31 ABNORMAL EKG: ICD-10-CM

## 2023-02-14 DIAGNOSIS — I95.0 IDIOPATHIC HYPOTENSION: ICD-10-CM

## 2023-02-14 DIAGNOSIS — E66.812 CLASS 2 SEVERE OBESITY DUE TO EXCESS CALORIES WITH SERIOUS COMORBIDITY AND BODY MASS INDEX (BMI) OF 39.0 TO 39.9 IN ADULT (CMS/HCC): ICD-10-CM

## 2023-02-14 DIAGNOSIS — Z99.2 ESRD ON HEMODIALYSIS (CMS/HCC): ICD-10-CM

## 2023-02-14 DIAGNOSIS — I48.11 LONGSTANDING PERSISTENT ATRIAL FIBRILLATION (CMS/HCC): Primary | ICD-10-CM

## 2023-02-14 DIAGNOSIS — I70.8 AORTO-ILIAC ATHEROSCLEROSIS (CMS/HCC)  (CMS/HCC): ICD-10-CM

## 2023-02-14 DIAGNOSIS — I25.10 CORONARY ARTERY DISEASE INVOLVING NATIVE CORONARY ARTERY OF NATIVE HEART WITHOUT ANGINA PECTORIS: ICD-10-CM

## 2023-02-14 DIAGNOSIS — I70.0 AORTO-ILIAC ATHEROSCLEROSIS (CMS/HCC)  (CMS/HCC): ICD-10-CM

## 2023-02-14 DIAGNOSIS — E66.01 CLASS 2 SEVERE OBESITY DUE TO EXCESS CALORIES WITH SERIOUS COMORBIDITY AND BODY MASS INDEX (BMI) OF 39.0 TO 39.9 IN ADULT (CMS/HCC): ICD-10-CM

## 2023-02-14 DIAGNOSIS — R06.09 DYSPNEA ON EXERTION: ICD-10-CM

## 2023-02-14 DIAGNOSIS — N18.6 ESRD ON HEMODIALYSIS (CMS/HCC): ICD-10-CM

## 2023-02-14 DIAGNOSIS — K92.1 GASTROINTESTINAL HEMORRHAGE WITH MELENA: ICD-10-CM

## 2023-02-14 DIAGNOSIS — K92.2 RECURRENT GASTROINTESTINAL HEMORRHAGE: ICD-10-CM

## 2023-02-14 PROCEDURE — 99215 OFFICE O/P EST HI 40 MIN: CPT | Performed by: INTERNAL MEDICINE

## 2023-02-14 PROCEDURE — 3008F BODY MASS INDEX DOCD: CPT | Performed by: INTERNAL MEDICINE

## 2023-02-14 PROCEDURE — 93000 ELECTROCARDIOGRAM COMPLETE: CPT | Performed by: INTERNAL MEDICINE

## 2023-02-14 RX ORDER — BUTALBITAL, ACETAMINOPHEN AND CAFFEINE 50; 325; 40 MG/1; MG/1; MG/1
1 TABLET ORAL EVERY 6 HOURS PRN
COMMUNITY
Start: 2023-02-09

## 2023-02-14 RX ORDER — FOLIC ACID/VIT B COMPLEX AND C 0.8 MG
1 TABLET ORAL DAILY
COMMUNITY
Start: 2022-12-05

## 2023-02-14 RX ORDER — PANTOPRAZOLE SODIUM 40 MG/1
40 TABLET, DELAYED RELEASE ORAL 2 TIMES DAILY
COMMUNITY
Start: 2023-02-05

## 2023-02-14 NOTE — LETTER
February 15, 2023     Alex Monique MD  [address] [facility] [address]    Patient: Chica Hurd  YOB: 1940  Date of Visit: 2/14/2023      Dear Dr. Monique:    Thank you for referring Chica Hurd to me for evaluation. Below are my notes for this consultation.    If you have questions, please do not hesitate to call me. I look forward to following your patient along with you.         Sincerely,        Christal Olvera MD        CC: DO Eliazar Goel MD Douglas B Esberg, MD O'Malley Tysko, Erin, MD  2/15/2023  2:13 PM  Signed       Cardiology Office Visit     Patient ID: Chica Hurd 82 y.o. female 1940  PCP: Alex Monique MD   History Present Illness     Chica Hurd returns to the office today for follow-up for the first time in about 3 months.  Please allow me to review her very complex history.  She is an 82-year-old -American female with history of hypertension, COPD, end-stage renal disease on dialysis and multiple declotting procedures for malfunctioning AV fistula.  She has a previous history of COVID, from which she recovered, but her major issue has been chronic anemia and then a severe GI bleed after being on warfarin for less than 1 week.  She had previously been on apixaban 2.5 mg twice daily and has had multiple episodes of GI bleeding secondary to colon polyps, gastric ulcer (nonbleeding) and gastritis.  She had dark stool on apixaban and because of her end-stage renal disease, was felt that warfarin may be easier to control and more effective for thromboembolic prophylaxis, but this was not the case.    Her hemoglobin dropped to 6 g/dL and she had an EGD showing small gastric diverticulum from prior peptic ulcer disease.  At that point there was no active bleeding.  She was referred to Dr. Plummer for watchman consultation.  He saw the patient in November 2022 and felt she was an appropriate candidate for a Watchman, but he was very  "concerned about the fact that those devices require 6 months of anticoagulation with either dual antiplatelet agents or an oral anticoagulant.  Obviously she was at very high risk for recurrent GI bleeding with warfarin.  The patient then recalled that she had a previous episode of GI bleeding while on antiplatelet therapy and at that point it was felt that her bleeding risk and inability to use dual antiplatelet therapy precluded her ability to have the device placed.  The only other option for her would be surgical appendage closure and I think she be very high risk for that type of surgery.    Today in the office I reviewed all of this with her, specifically the fact that she is at increased risk of stroke with a [id number] score of 4 and is on no anticoagulation because of her bleeding risk.  She continues in rate controlled atrial fibrillation.  Her blood pressure is soft, so she is only on Toprol-XL 25 mg twice daily.    She has lower extremity edema and wants to know why she is no longer taking water pill, but she admits that she really does not make any urine.  We stopped her water pill sometime ago for this reason.  She is also on chronic prednisone for her rheumatoid arthritis.  In retrospect she is not sure that her legs really look Much failure and says \"they have always been heavy\".  I suggested that she may want to consider compression stockings.    Allergies/Medications   Allergies:Penicillins    Medications:  •  acetaminophen, Take 500 mg by mouth every 12 (twelve) hours.  •  albuterol, Us as directed  •  budesonide-formoteroL, Inhale 1 puff 2 (two) times a day.    •  butalbital-acetaminophen-caff, Take 1 tablet by mouth every 6 (six) hours as needed.  •  DIALYVITE 800, Take 1 tablet by mouth daily.  •  escitalopram, Take 5 mg by mouth daily.    •  fluticasone propionate, Administer 1 spray into each nostril daily.    •  folic acid, Take 1 mg by mouth daily.  •  LORazepam, Take 0.5 mg by mouth 2 " "(two) times a day as needed.    •  metoprolol succinate XL, Take 0.5 tablets (25 mg total) by mouth 2 (two) times a day.  •  pantoprazole, Take 40 mg by mouth 2 (two) times a day.  •  predniSONE, Take 5 mg by mouth daily.    •  senna, Take 1 tablet by mouth daily.  •  sevelamer, TAKE 1 TABLET BY ORAL ROUTE 3 TIMES EVERY DAY WITH FOOD   Physical Exam     Vitals:    02/14/23 1401   BP: 110/70   BP Location: Right upper arm   Patient Position: Sitting   Pulse: (!) 103   Resp: 18   Weight: 88 kg (194 lb)   Height: 1.499 m (4' 11.02\")     BP Readings from Last 3 Encounters:   02/14/23 110/70   11/15/22 100/60   11/04/22 110/78     Wt Readings from Last 3 Encounters:   02/14/23 88 kg (194 lb)   11/15/22 86 kg (189 lb 9.6 oz)   11/04/22 87.3 kg (192 lb 6.4 oz)      Physical Exam:  Constitutional: Obese -American female.  No apparent distress.   Eyes: No icterus  ENT:  Mucosa moist  Neck: Supple, no JVD or hepatojugular reflux.  No bruits.  Cardiac: Normal S1 and S2, irregularly irregular, no S3.  No rub.  There is 2/6 systolic murmur at the right and left sternal borders.  Lungs: Clear to auscultation bilaterally.  No wheezing.  Abdomen: Soft, nontender, positive normoactive bowel sounds.  No bruit.  Extremities: No clubbing or cyanosis.  No calf tenderness.  1-2+ lower extremity edema, nonpitting.  Distal pulses are intact.  Skin: Warm and dry.  No jaundice.  Musculoskeletal: No joint swelling or erythema.  Chronic arthritic deformity multiple joints.  Neurologic: Awake, alert, oriented.  Moving all extremities.  Psychiatric: No agitation.    Labs/Imaging/Procedures   Labs  No recent labs available for review.  States that they are done frequently at dialysis.    10/5/2022: Sodium 135, potassium 4.7, chloride 98, CO2 25, glucose 77, BUN 25 and creatinine 6.6.  Hemoglobin 7.9.  Platelets 255,000.    Imaging  Echo 10/4/2022: Performed at [facility]-normal LV size with mild concentric LVH.  LVEF 65-70%.  " Atrial fibrillation throughout study.  Mildly dilated RV with preserved function.  Mildly dilated left atrium.  Moderate MAC.  Mild to moderate MR.  No mitral stenosis.  Calcified aortic valve with mild to moderate aortic stenosis.  Mean AV gradient 15 mmHg.  Calculated ANGELA 1.5 cm².  Moderate to severe TR.  RVSP 55 mmHg.  Trivial pericardial effusion.     Holter monitor 7/28/2022: Atrial fibrillation for 100% of the recording.  Average  bpm, min HR 77 bpm and max  bpm.  Rare PVCs.     SEAN 6/25/2021: Performed at [facility] [place].  Right SEAN 1.22.  Left SEAN 1.25.     Carotid ultrasound 2021: No hemodynamically significant stenosis.  Small amount of nonobstructing thrombus right internal jugular vein.     CTA chest 10/4/2020: No evidence of pulmonary embolism.  No evidence of aortic dissection.  No pleural effusion.  Enlarged heart with extensive coronary artery calcification.  Calcified aortic valve.  Heterogeneous enlarged thyroid.  Moderate aortoiliac calcification.     Echo 11/4/2021: Normal LV size with mild concentric LVH.  LVEF 65-70%.  Wall motion appears grossly normal.  No LVOT gradient.  Mildly dilated RV with low normal function.  Moderate TR and estimated PASP 50-55 mmHg.  Moderately dilated right atrium.  Severely dilated left atrium.  Grade II inflow pattern consistent with elevated filling pressures.  Trileaflet diffusely calcified aortic valve with mean gradient 16 mmHg.  Calculated ANGELA 1.4 cm².  Dimensionless index greater than 0.5.  Trace AI.  Maximum ascending aortic diameter 3.9 cm.  Thickened mitral valve leaflets with moderate MR directed posteriorly.  Dilated IVC.  Trivial pericardial effusion.     Echo 2018: Normal LV size and function with impaired relaxation.  Mean AV gradient 11 mmHg.  At most mild MR.  PASP 41 mmHg.    EKG  Assessment/Plan     1. Longstanding persistent atrial fibrillation (CMS/HCC)    2. Abnormal EKG    3. ESRD on hemodialysis (CMS/HCC)    4. Idiopathic  "hypotension    5. Dyspnea on exertion    6. Gastrointestinal hemorrhage with melena    7. Aorto-iliac atherosclerosis (CMS/HCC)    8. Coronary artery disease involving native coronary artery of native heart without angina pectoris    9. Class 2 severe obesity due to excess calories with serious comorbidity and body mass index (BMI) of 39.0 to 39.9 in adult (CMS/HCC)    10. Recurrent gastrointestinal hemorrhage       Obviously, this is a very difficult situation.  Mrs. Hurd is at increased thromboembolic risk, but unable to tolerate warfarin, DOAC or even antiplatelet therapy without eventually developing life-threatening GI bleeding.  She did not even make a full week on warfarin therapy.  I explained to her today that she is on no stroke prophylaxis and atrial fibrillation puts her at an increased risk for stroke and TIA.  She understands this and says \"I am 82, what can I do\".  She is not an appropriate candidate for a percutaneous closure device.  I think operative risk for open surgical appendage closure is excessive.    Her blood pressure is adequate on metoprolol 25 mg twice daily.  Her volume is managed at dialysis.    She has dyspnea on exertion, which I think is multifactorial due to obesity, longstanding hypertension, atrial fibrillation, deconditioning and likely coronary artery disease.  She has had extensive coronary calcification on previous CTs, which we have been managing medically.  Her aortic stenosis on an echocardiogram done in October 2022 was mild to moderate.  She has mild to moderate mitral regurgitation and pulmonary hypertension with an RVSP 55 mmHg.    She is not on statin therapy and I have no recent lab work available.  If she has not had recent LFTs and a lipid profile, I would recommend that when we performed that we can assess the appropriateness of adding a statin.    She denies any claudication symptoms at her current levels of activity.  She asked me if she could return to " water aerobics and chair yoga, both of which I think would be helpful for her.  I told her that she should ease back into exercise slowly.    Return in about 6 months (around 8/14/2023) for follow-up, earlier if any change in symptoms.  We had a prolonged discussion about these complex clinical issues and went over the various important aspects to consider. All questions were answered.  I spent 42 minutes on the date of service performing the specified activities.  Available medical records reviewed and updated including laboratory data, imaging studies, previous outpatient and inpatient records.  Counseling/education performed. Care everywhere utilized where appropriate.    Thank you for allowing me to participate in the care of this patient.  I hope this information is helpful.      Christal Olvera MD [facility], FASE  2/15/2023  2:04 PM  This document was generated utilizing voice recognition technology. Please excuse any typographical errors which may be present.

## 2023-02-15 NOTE — PROGRESS NOTES
Cardiology Office Visit     Patient ID: Chica Hurd 82 y.o. female 1940  PCP: Alex Monique MD   History Present Illness     Chica Hurd returns to the office today for follow-up for the first time in about 3 months.  Please allow me to review her very complex history.  She is an 82-year-old -American female with history of hypertension, COPD, end-stage renal disease on dialysis and multiple declotting procedures for malfunctioning AV fistula.  She has a previous history of COVID, from which she recovered, but her major issue has been chronic anemia and then a severe GI bleed after being on warfarin for less than 1 week.  She had previously been on apixaban 2.5 mg twice daily and has had multiple episodes of GI bleeding secondary to colon polyps, gastric ulcer (nonbleeding) and gastritis.  She had dark stool on apixaban and because of her end-stage renal disease, was felt that warfarin may be easier to control and more effective for thromboembolic prophylaxis, but this was not the case.    Her hemoglobin dropped to 6 g/dL and she had an EGD showing small gastric diverticulum from prior peptic ulcer disease.  At that point there was no active bleeding.  She was referred to Dr. Plummer for watchman consultation.  He saw the patient in November 2022 and felt she was an appropriate candidate for a Watchman, but he was very concerned about the fact that those devices require 6 months of anticoagulation with either dual antiplatelet agents or an oral anticoagulant.  Obviously she was at very high risk for recurrent GI bleeding with warfarin.  The patient then recalled that she had a previous episode of GI bleeding while on antiplatelet therapy and at that point it was felt that her bleeding risk and inability to use dual antiplatelet therapy precluded her ability to have the device placed.  The only other option for her would be surgical appendage closure and I think she be very high risk for that  "type of surgery.    Today in the office I reviewed all of this with her, specifically the fact that she is at increased risk of stroke with a [id number] score of 4 and is on no anticoagulation because of her bleeding risk.  She continues in rate controlled atrial fibrillation.  Her blood pressure is soft, so she is only on Toprol-XL 25 mg twice daily.    She has lower extremity edema and wants to know why she is no longer taking water pill, but she admits that she really does not make any urine.  We stopped her water pill sometime ago for this reason.  She is also on chronic prednisone for her rheumatoid arthritis.  In retrospect she is not sure that her legs really look Much failure and says \"they have always been heavy\".  I suggested that she may want to consider compression stockings.    Allergies/Medications   Allergies:Penicillins    Medications:  •  acetaminophen, Take 500 mg by mouth every 12 (twelve) hours.  •  albuterol, Us as directed  •  budesonide-formoteroL, Inhale 1 puff 2 (two) times a day.    •  butalbital-acetaminophen-caff, Take 1 tablet by mouth every 6 (six) hours as needed.  •  DIALYVITE 800, Take 1 tablet by mouth daily.  •  escitalopram, Take 5 mg by mouth daily.    •  fluticasone propionate, Administer 1 spray into each nostril daily.    •  folic acid, Take 1 mg by mouth daily.  •  LORazepam, Take 0.5 mg by mouth 2 (two) times a day as needed.    •  metoprolol succinate XL, Take 0.5 tablets (25 mg total) by mouth 2 (two) times a day.  •  pantoprazole, Take 40 mg by mouth 2 (two) times a day.  •  predniSONE, Take 5 mg by mouth daily.    •  senna, Take 1 tablet by mouth daily.  •  sevelamer, TAKE 1 TABLET BY ORAL ROUTE 3 TIMES EVERY DAY WITH FOOD   Physical Exam     Vitals:    02/14/23 1401   BP: 110/70   BP Location: Right upper arm   Patient Position: Sitting   Pulse: (!) 103   Resp: 18   Weight: 88 kg (194 lb)   Height: 1.499 m (4' 11.02\")     BP Readings from Last 3 Encounters:   02/14/23 " 110/70   11/15/22 100/60   11/04/22 110/78     Wt Readings from Last 3 Encounters:   02/14/23 88 kg (194 lb)   11/15/22 86 kg (189 lb 9.6 oz)   11/04/22 87.3 kg (192 lb 6.4 oz)      Physical Exam:  Constitutional: Obese -American female.  No apparent distress.   Eyes: No icterus  ENT:  Mucosa moist  Neck: Supple, no JVD or hepatojugular reflux.  No bruits.  Cardiac: Normal S1 and S2, irregularly irregular, no S3.  No rub.  There is 2/6 systolic murmur at the right and left sternal borders.  Lungs: Clear to auscultation bilaterally.  No wheezing.  Abdomen: Soft, nontender, positive normoactive bowel sounds.  No bruit.  Extremities: No clubbing or cyanosis.  No calf tenderness.  1-2+ lower extremity edema, nonpitting.  Distal pulses are intact.  Skin: Warm and dry.  No jaundice.  Musculoskeletal: No joint swelling or erythema.  Chronic arthritic deformity multiple joints.  Neurologic: Awake, alert, oriented.  Moving all extremities.  Psychiatric: No agitation.    Labs/Imaging/Procedures   Labs  No recent labs available for review.  States that they are done frequently at dialysis.    10/5/2022: Sodium 135, potassium 4.7, chloride 98, CO2 25, glucose 77, BUN 25 and creatinine 6.6.  Hemoglobin 7.9.  Platelets 255,000.    Imaging  Echo 10/4/2022: Performed at [facility]-normal LV size with mild concentric LVH.  LVEF 65-70%.  Atrial fibrillation throughout study.  Mildly dilated RV with preserved function.  Mildly dilated left atrium.  Moderate MAC.  Mild to moderate MR.  No mitral stenosis.  Calcified aortic valve with mild to moderate aortic stenosis.  Mean AV gradient 15 mmHg.  Calculated ANGELA 1.5 cm².  Moderate to severe TR.  RVSP 55 mmHg.  Trivial pericardial effusion.     Holter monitor 7/28/2022: Atrial fibrillation for 100% of the recording.  Average  bpm, min HR 77 bpm and max  bpm.  Rare PVCs.     SEAN 6/25/2021: Performed at [facility].  Right SEAN 1.22.  Left SEAN  1.25.     Carotid ultrasound 2021: No hemodynamically significant stenosis.  Small amount of nonobstructing thrombus right internal jugular vein.     CTA chest 10/4/2020: No evidence of pulmonary embolism.  No evidence of aortic dissection.  No pleural effusion.  Enlarged heart with extensive coronary artery calcification.  Calcified aortic valve.  Heterogeneous enlarged thyroid.  Moderate aortoiliac calcification.     Echo 11/4/2021: Normal LV size with mild concentric LVH.  LVEF 65-70%.  Wall motion appears grossly normal.  No LVOT gradient.  Mildly dilated RV with low normal function.  Moderate TR and estimated PASP 50-55 mmHg.  Moderately dilated right atrium.  Severely dilated left atrium.  Grade II inflow pattern consistent with elevated filling pressures.  Trileaflet diffusely calcified aortic valve with mean gradient 16 mmHg.  Calculated ANGELA 1.4 cm².  Dimensionless index greater than 0.5.  Trace AI.  Maximum ascending aortic diameter 3.9 cm.  Thickened mitral valve leaflets with moderate MR directed posteriorly.  Dilated IVC.  Trivial pericardial effusion.     Echo 2018: Normal LV size and function with impaired relaxation.  Mean AV gradient 11 mmHg.  At most mild MR.  PASP 41 mmHg.    EKG  Assessment/Plan     1. Longstanding persistent atrial fibrillation (CMS/HCC)    2. Abnormal EKG    3. ESRD on hemodialysis (CMS/HCC)    4. Idiopathic hypotension    5. Dyspnea on exertion    6. Gastrointestinal hemorrhage with melena    7. Aorto-iliac atherosclerosis (CMS/HCC)    8. Coronary artery disease involving native coronary artery of native heart without angina pectoris    9. Class 2 severe obesity due to excess calories with serious comorbidity and body mass index (BMI) of 39.0 to 39.9 in adult (CMS/HCC)    10. Recurrent gastrointestinal hemorrhage       Obviously, this is a very difficult situation.  Mrs. Hurd is at increased thromboembolic risk, but unable to tolerate warfarin, DOAC or even antiplatelet  "therapy without eventually developing life-threatening GI bleeding.  She did not even make a full week on warfarin therapy.  I explained to her today that she is on no stroke prophylaxis and atrial fibrillation puts her at an increased risk for stroke and TIA.  She understands this and says \"I am 82, what can I do\".  She is not an appropriate candidate for a percutaneous closure device.  I think operative risk for open surgical appendage closure is excessive.    Her blood pressure is adequate on metoprolol 25 mg twice daily.  Her volume is managed at dialysis.    She has dyspnea on exertion, which I think is multifactorial due to obesity, longstanding hypertension, atrial fibrillation, deconditioning and likely coronary artery disease.  She has had extensive coronary calcification on previous CTs, which we have been managing medically.  Her aortic stenosis on an echocardiogram done in October 2022 was mild to moderate.  She has mild to moderate mitral regurgitation and pulmonary hypertension with an RVSP 55 mmHg.    She is not on statin therapy and I have no recent lab work available.  If she has not had recent LFTs and a lipid profile, I would recommend that when we performed that we can assess the appropriateness of adding a statin.    She denies any claudication symptoms at her current levels of activity.  She asked me if she could return to water aerobics and chair yoga, both of which I think would be helpful for her.  I told her that she should ease back into exercise slowly.    Return in about 6 months (around 8/14/2023) for follow-up, earlier if any change in symptoms.  We had a prolonged discussion about these complex clinical issues and went over the various important aspects to consider. All questions were answered.  I spent 42 minutes on the date of service performing the specified activities.  Available medical records reviewed and updated including laboratory data, imaging studies, previous outpatient " and inpatient records.  Counseling/education performed. Care everywhere utilized where appropriate.    Thank you for allowing me to participate in the care of this patient.  I hope this information is helpful.      Christal Olvera MD [facility], [facility]  2/15/2023  2:04 PM  This document was generated utilizing voice recognition technology. Please excuse any typographical errors which may be present.

## 2023-02-28 ENCOUNTER — APPOINTMENT (RX ONLY)
Dept: URBAN - METROPOLITAN AREA CLINIC 28 | Facility: CLINIC | Age: 83
Setting detail: DERMATOLOGY
End: 2023-02-28

## 2023-02-28 DIAGNOSIS — L20.89 OTHER ATOPIC DERMATITIS: ICD-10-CM

## 2023-02-28 PROBLEM — L20.84 INTRINSIC (ALLERGIC) ECZEMA: Status: ACTIVE | Noted: 2023-02-28

## 2023-02-28 PROCEDURE — ? COUNSELING

## 2023-02-28 PROCEDURE — ? PRESCRIPTION MEDICATION MANAGEMENT

## 2023-02-28 PROCEDURE — 99213 OFFICE O/P EST LOW 20 MIN: CPT

## 2023-02-28 PROCEDURE — ? PRESCRIPTION

## 2023-02-28 RX ORDER — HYDROCORTISONE 25 MG/G
1 CREAM TOPICAL
Qty: 454 | Refills: 3 | Status: ERX | COMMUNITY
Start: 2023-02-28

## 2023-02-28 RX ORDER — FEXOFENADINE HYDROCHLORIDE 180 MG/1
1 TABLET ORAL
Qty: 30 | Refills: 6 | Status: ERX | COMMUNITY
Start: 2023-02-28

## 2023-02-28 RX ADMIN — HYDROCORTISONE 1: 25 CREAM TOPICAL at 00:00

## 2023-02-28 RX ADMIN — FEXOFENADINE HYDROCHLORIDE 1: 180 TABLET ORAL at 00:00

## 2023-02-28 ASSESSMENT — LOCATION ZONE DERM
LOCATION ZONE: ARM
LOCATION ZONE: TRUNK
LOCATION ZONE: LEG

## 2023-02-28 ASSESSMENT — LOCATION DETAILED DESCRIPTION DERM
LOCATION DETAILED: LEFT ANTERIOR PROXIMAL THIGH
LOCATION DETAILED: RIGHT ANTERIOR DISTAL THIGH
LOCATION DETAILED: LEFT ANTERIOR DISTAL UPPER ARM
LOCATION DETAILED: UPPER STERNUM

## 2023-02-28 ASSESSMENT — LOCATION SIMPLE DESCRIPTION DERM
LOCATION SIMPLE: LEFT THIGH
LOCATION SIMPLE: RIGHT THIGH
LOCATION SIMPLE: LEFT UPPER ARM
LOCATION SIMPLE: CHEST

## 2023-02-28 NOTE — PROCEDURE: PRESCRIPTION MEDICATION MANAGEMENT
Discontinue Regimen: mupirocin 2 % topical ointment: Apply a thin layer QDAY to AA of body
Initiate Treatment: fexofenadine 180 mg tablet: Take one tab po QDAY\\nhydrocortisone 2.5 % topical cream: Apply a thin layer to the AA of rash QDAY to bid
Detail Level: Zone
Render In Strict Bullet Format?: No